# Patient Record
Sex: FEMALE | Race: BLACK OR AFRICAN AMERICAN | NOT HISPANIC OR LATINO | Employment: OTHER | RURAL
[De-identification: names, ages, dates, MRNs, and addresses within clinical notes are randomized per-mention and may not be internally consistent; named-entity substitution may affect disease eponyms.]

---

## 2019-01-23 ENCOUNTER — HISTORICAL (OUTPATIENT)
Dept: ADMINISTRATIVE | Facility: HOSPITAL | Age: 54
End: 2019-01-23

## 2019-01-24 LAB
LAB AP CLINICAL INFORMATION: NORMAL
LAB AP DIAGNOSIS - HISTORICAL: NORMAL
LAB AP GROSS PATHOLOGY - HISTORICAL: NORMAL
LAB AP SPECIMEN SUBMITTED - HISTORICAL: NORMAL

## 2020-04-09 ENCOUNTER — HISTORICAL (OUTPATIENT)
Dept: ADMINISTRATIVE | Facility: HOSPITAL | Age: 55
End: 2020-04-09

## 2020-04-28 ENCOUNTER — HISTORICAL (OUTPATIENT)
Dept: ADMINISTRATIVE | Facility: HOSPITAL | Age: 55
End: 2020-04-28

## 2020-04-28 LAB
BASOPHILS # BLD AUTO: 0.01 X10E3/UL (ref 0–0.2)
BASOPHILS NFR BLD AUTO: 0.2 % (ref 0–1)
EOSINOPHIL # BLD AUTO: 0 X10E3/UL (ref 0–0.5)
EOSINOPHIL NFR BLD AUTO: 0 % (ref 1–4)
ERYTHROCYTE [DISTWIDTH] IN BLOOD BY AUTOMATED COUNT: 15 % (ref 11.5–14.5)
HCT VFR BLD AUTO: 37.8 % (ref 38–47)
HGB BLD-MCNC: 11.9 G/DL (ref 12–16)
IMM GRANULOCYTES # BLD AUTO: 0.02 X10E3/UL (ref 0–0.04)
IMM GRANULOCYTES NFR BLD: 0.5 % (ref 0–0.4)
LYMPHOCYTES # BLD AUTO: 1.38 X10E3/UL (ref 1–4.8)
LYMPHOCYTES NFR BLD AUTO: 33.7 % (ref 27–41)
MCH RBC QN AUTO: 30.1 PG (ref 27–31)
MCHC RBC AUTO-ENTMCNC: 31.5 G/DL (ref 32–36)
MCV RBC AUTO: 95.5 FL (ref 80–96)
MONOCYTES # BLD AUTO: 0.34 X10E3/UL (ref 0–0.8)
MONOCYTES NFR BLD AUTO: 8.3 % (ref 2–6)
MPC BLD CALC-MCNC: 10 FL (ref 9.4–12.4)
NEUTROPHILS # BLD AUTO: 2.34 X10E3/UL (ref 1.8–7.7)
NEUTROPHILS NFR BLD AUTO: 57.3 % (ref 53–65)
NRBC # BLD AUTO: 0 X10E3/UL (ref 0–0)
NRBC, AUTO (.00): 0 /100 (ref 0–0)
PLATELET # BLD AUTO: 233 X10E3/UL (ref 150–400)
POTASSIUM SERPL-SCNC: 3.4 MMOL/L (ref 3.5–5.1)
RBC # BLD AUTO: 3.96 X10E6/UL (ref 4.2–5.4)
WBC # BLD AUTO: 4.09 X10E3/UL (ref 4.5–11)

## 2020-06-10 ENCOUNTER — HISTORICAL (OUTPATIENT)
Dept: ADMINISTRATIVE | Facility: HOSPITAL | Age: 55
End: 2020-06-10

## 2020-06-10 LAB
BACTERIA #/AREA URNS HPF: ABNORMAL /HPF
BILIRUB UR QL STRIP: NEGATIVE MG/DL
CLARITY UR: ABNORMAL
COLOR UR: ABNORMAL
GLUCOSE UR STRIP-MCNC: NEGATIVE MG/DL
KETONES UR STRIP-SCNC: 15 MG/DL
LEUKOCYTE ESTERASE UR QL STRIP: ABNORMAL LEU/UL
MUCOUS THREADS #/AREA URNS HPF: ABNORMAL /HPF
NITRITE UR QL STRIP: NEGATIVE
PH UR STRIP: 5 PH UNITS (ref 5–8)
PROT UR QL STRIP: NEGATIVE MG/DL
RBC # UR STRIP: NEGATIVE ERY/UL
RBC #/AREA URNS HPF: ABNORMAL /HPF (ref 0–3)
SP GR UR STRIP: >=1.03 (ref 1–1.03)
SQUAMOUS #/AREA URNS LPF: ABNORMAL /LPF
UROBILINOGEN UR STRIP-ACNC: 0.2 MG/DL
WBC #/AREA URNS HPF: ABNORMAL /HPF (ref 0–5)

## 2020-06-13 LAB
REPORT: 38
REPORT: NORMAL

## 2020-07-01 ENCOUNTER — HISTORICAL (OUTPATIENT)
Dept: ADMINISTRATIVE | Facility: HOSPITAL | Age: 55
End: 2020-07-01

## 2021-05-18 DIAGNOSIS — I69.359 HEMIPLEGIA FOLLOWING CEREBROVASCULAR ACCIDENT (CVA): Primary | ICD-10-CM

## 2021-06-15 ENCOUNTER — CLINICAL SUPPORT (OUTPATIENT)
Dept: REHABILITATION | Facility: HOSPITAL | Age: 56
End: 2021-06-15
Payer: MEDICARE

## 2021-06-15 DIAGNOSIS — M25.652 STIFFNESS OF HIP JOINT, LEFT: ICD-10-CM

## 2021-06-15 DIAGNOSIS — R29.898 WEAKNESS OF LEFT LEG: ICD-10-CM

## 2021-06-15 DIAGNOSIS — I69.359 HEMIPLEGIA FOLLOWING CEREBROVASCULAR ACCIDENT (CVA): Primary | ICD-10-CM

## 2021-06-15 PROCEDURE — 97162 PT EVAL MOD COMPLEX 30 MIN: CPT

## 2021-06-17 ENCOUNTER — CLINICAL SUPPORT (OUTPATIENT)
Dept: REHABILITATION | Facility: HOSPITAL | Age: 56
End: 2021-06-17
Payer: MEDICARE

## 2021-06-17 DIAGNOSIS — I69.359 HEMIPLEGIA FOLLOWING CEREBROVASCULAR ACCIDENT (CVA): Primary | ICD-10-CM

## 2021-06-17 DIAGNOSIS — M25.652 STIFFNESS OF HIP JOINT, LEFT: ICD-10-CM

## 2021-06-17 DIAGNOSIS — R29.898 WEAKNESS OF LEFT LEG: ICD-10-CM

## 2021-06-17 PROCEDURE — 97110 THERAPEUTIC EXERCISES: CPT

## 2021-06-17 PROCEDURE — 97116 GAIT TRAINING THERAPY: CPT

## 2021-06-22 ENCOUNTER — CLINICAL SUPPORT (OUTPATIENT)
Dept: REHABILITATION | Facility: HOSPITAL | Age: 56
End: 2021-06-22
Payer: MEDICARE

## 2021-06-22 DIAGNOSIS — R29.898 WEAKNESS OF LEFT LEG: Primary | ICD-10-CM

## 2021-06-22 DIAGNOSIS — M25.652 STIFFNESS OF HIP JOINT, LEFT: ICD-10-CM

## 2021-06-22 PROCEDURE — 97110 THERAPEUTIC EXERCISES: CPT

## 2021-06-24 ENCOUNTER — CLINICAL SUPPORT (OUTPATIENT)
Dept: REHABILITATION | Facility: HOSPITAL | Age: 56
End: 2021-06-24
Payer: MEDICARE

## 2021-06-24 DIAGNOSIS — I69.359 HEMIPLEGIA FOLLOWING CEREBROVASCULAR ACCIDENT (CVA): ICD-10-CM

## 2021-06-24 DIAGNOSIS — M25.652 STIFFNESS OF HIP JOINT, LEFT: ICD-10-CM

## 2021-06-24 DIAGNOSIS — R29.898 WEAKNESS OF LEFT LEG: Primary | ICD-10-CM

## 2021-06-24 PROCEDURE — 97110 THERAPEUTIC EXERCISES: CPT

## 2021-06-24 PROCEDURE — 97116 GAIT TRAINING THERAPY: CPT

## 2021-07-01 ENCOUNTER — CLINICAL SUPPORT (OUTPATIENT)
Dept: REHABILITATION | Facility: HOSPITAL | Age: 56
End: 2021-07-01
Payer: MEDICARE

## 2021-07-01 DIAGNOSIS — R29.898 WEAKNESS OF LEFT LEG: Primary | ICD-10-CM

## 2021-07-01 DIAGNOSIS — M25.652 STIFFNESS OF HIP JOINT, LEFT: ICD-10-CM

## 2021-07-01 PROCEDURE — 97110 THERAPEUTIC EXERCISES: CPT

## 2021-07-01 PROCEDURE — 97116 GAIT TRAINING THERAPY: CPT

## 2021-07-06 ENCOUNTER — CLINICAL SUPPORT (OUTPATIENT)
Dept: REHABILITATION | Facility: HOSPITAL | Age: 56
End: 2021-07-06
Payer: MEDICARE

## 2021-07-06 DIAGNOSIS — R29.898 WEAKNESS OF LEFT LEG: Primary | ICD-10-CM

## 2021-07-06 DIAGNOSIS — M25.652 STIFFNESS OF HIP JOINT, LEFT: ICD-10-CM

## 2021-07-06 PROCEDURE — 97116 GAIT TRAINING THERAPY: CPT

## 2021-07-06 PROCEDURE — 97110 THERAPEUTIC EXERCISES: CPT

## 2021-07-08 ENCOUNTER — CLINICAL SUPPORT (OUTPATIENT)
Dept: REHABILITATION | Facility: HOSPITAL | Age: 56
End: 2021-07-08
Payer: MEDICARE

## 2021-07-08 DIAGNOSIS — R29.898 WEAKNESS OF LEFT LEG: Primary | ICD-10-CM

## 2021-07-08 DIAGNOSIS — M25.652 STIFFNESS OF HIP JOINT, LEFT: ICD-10-CM

## 2021-07-08 PROCEDURE — 97116 GAIT TRAINING THERAPY: CPT

## 2021-07-08 PROCEDURE — 97110 THERAPEUTIC EXERCISES: CPT

## 2021-07-13 ENCOUNTER — CLINICAL SUPPORT (OUTPATIENT)
Dept: REHABILITATION | Facility: HOSPITAL | Age: 56
End: 2021-07-13
Payer: MEDICARE

## 2021-07-13 DIAGNOSIS — M25.652 STIFFNESS OF HIP JOINT, LEFT: ICD-10-CM

## 2021-07-13 DIAGNOSIS — R29.898 WEAKNESS OF LEFT LEG: ICD-10-CM

## 2021-07-13 PROCEDURE — 97110 THERAPEUTIC EXERCISES: CPT | Mod: CQ

## 2021-07-13 PROCEDURE — 97116 GAIT TRAINING THERAPY: CPT | Mod: CQ

## 2021-07-15 ENCOUNTER — CLINICAL SUPPORT (OUTPATIENT)
Dept: REHABILITATION | Facility: HOSPITAL | Age: 56
End: 2021-07-15
Payer: MEDICARE

## 2021-07-15 DIAGNOSIS — M25.652 STIFFNESS OF HIP JOINT, LEFT: ICD-10-CM

## 2021-07-15 DIAGNOSIS — I69.359 HEMIPLEGIA FOLLOWING CEREBROVASCULAR ACCIDENT (CVA): ICD-10-CM

## 2021-07-15 DIAGNOSIS — R29.898 WEAKNESS OF LEFT LEG: Primary | ICD-10-CM

## 2021-07-15 PROCEDURE — 97116 GAIT TRAINING THERAPY: CPT

## 2021-07-15 PROCEDURE — 97110 THERAPEUTIC EXERCISES: CPT

## 2021-07-20 ENCOUNTER — CLINICAL SUPPORT (OUTPATIENT)
Dept: REHABILITATION | Facility: HOSPITAL | Age: 56
End: 2021-07-20
Payer: MEDICARE

## 2021-07-20 DIAGNOSIS — R29.898 WEAKNESS OF LEFT LEG: ICD-10-CM

## 2021-07-20 DIAGNOSIS — M25.652 STIFFNESS OF HIP JOINT, LEFT: ICD-10-CM

## 2021-07-20 PROBLEM — I69.359: Status: RESOLVED | Noted: 2021-06-15 | Resolved: 2021-07-20

## 2021-07-20 PROCEDURE — 97110 THERAPEUTIC EXERCISES: CPT | Mod: CQ

## 2021-07-20 PROCEDURE — 97116 GAIT TRAINING THERAPY: CPT | Mod: CQ

## 2022-10-17 ENCOUNTER — HOSPITAL ENCOUNTER (EMERGENCY)
Facility: HOSPITAL | Age: 57
Discharge: HOME | End: 2022-10-17
Payer: MEDICARE

## 2022-10-17 VITALS
BODY MASS INDEX: 48.76 KG/M2 | OXYGEN SATURATION: 99 % | DIASTOLIC BLOOD PRESSURE: 75 MMHG | SYSTOLIC BLOOD PRESSURE: 145 MMHG | WEIGHT: 265 LBS | HEIGHT: 62 IN | TEMPERATURE: 99 F | RESPIRATION RATE: 20 BRPM | HEART RATE: 69 BPM

## 2022-10-17 DIAGNOSIS — J06.9 UPPER RESPIRATORY TRACT INFECTION, UNSPECIFIED TYPE: Primary | ICD-10-CM

## 2022-10-17 DIAGNOSIS — R07.9 CHEST PAIN: ICD-10-CM

## 2022-10-17 PROCEDURE — 93005 ELECTROCARDIOGRAM TRACING: CPT

## 2022-10-17 PROCEDURE — 99283 EMERGENCY DEPT VISIT LOW MDM: CPT

## 2022-10-17 PROCEDURE — 93010 ELECTROCARDIOGRAM REPORT: CPT | Performed by: FAMILY MEDICINE

## 2022-10-17 PROCEDURE — 99282 EMERGENCY DEPT VISIT SF MDM: CPT | Mod: GF | Performed by: NURSE PRACTITIONER

## 2022-10-18 NOTE — DISCHARGE INSTRUCTIONS
Follow-up with primary care.  Treat symptoms.  Drink plenty of water.  Return to the ER as needed for emergent conditions.

## 2022-10-18 NOTE — ED PROVIDER NOTES
"Encounter Date: 10/17/2022       History     Chief Complaint   Patient presents with    Cough    Chest Pain     Left chest hurts when she coughs, no steady chest pain, coughing for 3 days now     Patient presents with nasal drainage, congestion, and a painful cough times 2-3 days.  Pain is under her left breast she thought it was a tight bra strap.  The pain is only present with a cough and resolved thereafter.  No change in intake or output.  She called her PCP today, but was unable to arrange transport.    Review of patient's allergies indicates:   Allergen Reactions    Pcn [penicillins] Rash     Past Medical History:   Diagnosis Date    Hypertension     Stroke      History reviewed. No pertinent surgical history.  History reviewed. No pertinent family history.  Social History     Tobacco Use    Smoking status: Never    Smokeless tobacco: Current     Types: Chew, Snuff   Substance Use Topics    Alcohol use: Yes     Comment: beer "almost every day", states 2-3 quarts of beer a day    Drug use: Never     Review of Systems   Constitutional:  Negative for fever.   HENT:  Positive for congestion and rhinorrhea. Negative for trouble swallowing.    Eyes:  Negative for visual disturbance.   Respiratory:  Positive for cough. Negative for shortness of breath.    Cardiovascular:  Positive for chest pain (bsent at rest, present with cough). Negative for palpitations and leg swelling.   Gastrointestinal:  Negative for abdominal pain, diarrhea, nausea and vomiting.   Genitourinary:  Negative for decreased urine volume, dysuria, frequency and hematuria.   Skin:  Negative for color change.   Neurological:  Negative for dizziness, numbness and headaches.     Physical Exam     Initial Vitals [10/17/22 1828]   BP Pulse Resp Temp SpO2   (!) 152/82 64 (!) 22 98.5 °F (36.9 °C) 99 %      MAP       --         Physical Exam    Nursing note and vitals reviewed.  Constitutional: No distress.   HENT:   Head: Normocephalic and atraumatic. "   Injected with moderate PND   Eyes: EOM are normal. Pupils are equal, round, and reactive to light.   Neck: Neck supple.   Cardiovascular:  Normal rate, regular rhythm and normal heart sounds.           Pulmonary/Chest: Breath sounds normal. No respiratory distress. She exhibits no tenderness.   Abdominal: Abdomen is soft. There is no abdominal tenderness.   Musculoskeletal:         General: Normal range of motion.      Cervical back: Neck supple.     Neurological: She is alert. GCS score is 15. GCS eye subscore is 4. GCS verbal subscore is 5. GCS motor subscore is 6.   Skin: Skin is warm and dry. Capillary refill takes less than 2 seconds.       Medical Screening Exam   See Full Note    ED Course   Procedures  Labs Reviewed - No data to display       Imaging Results    None          Medications - No data to display                    Clinical Impression:   Final diagnoses:  [J06.9] Upper respiratory tract infection, unspecified type (Primary)      ED Disposition Condition    Discharge Stable          ED Prescriptions    None       Follow-up Information    None          TODD Modi  10/17/22 1938

## 2023-07-18 ENCOUNTER — HOSPITAL ENCOUNTER (EMERGENCY)
Facility: HOSPITAL | Age: 58
Discharge: HOME | End: 2023-07-18
Payer: MEDICARE

## 2023-07-18 VITALS
HEART RATE: 65 BPM | RESPIRATION RATE: 20 BRPM | TEMPERATURE: 98 F | SYSTOLIC BLOOD PRESSURE: 141 MMHG | HEIGHT: 62 IN | OXYGEN SATURATION: 100 % | BODY MASS INDEX: 46.93 KG/M2 | DIASTOLIC BLOOD PRESSURE: 78 MMHG | WEIGHT: 255 LBS

## 2023-07-18 DIAGNOSIS — M54.50 ACUTE RIGHT-SIDED LOW BACK PAIN WITHOUT SCIATICA: Primary | ICD-10-CM

## 2023-07-18 DIAGNOSIS — M25.552 LEFT HIP PAIN: ICD-10-CM

## 2023-07-18 PROCEDURE — 99283 PR EMERGENCY DEPT VISIT,LEVEL III: ICD-10-PCS | Mod: ,,, | Performed by: PHYSICIAN ASSISTANT

## 2023-07-18 PROCEDURE — 99284 EMERGENCY DEPT VISIT MOD MDM: CPT

## 2023-07-18 PROCEDURE — 99283 EMERGENCY DEPT VISIT LOW MDM: CPT | Mod: ,,, | Performed by: PHYSICIAN ASSISTANT

## 2023-07-18 PROCEDURE — 96372 THER/PROPH/DIAG INJ SC/IM: CPT | Performed by: PHYSICIAN ASSISTANT

## 2023-07-18 PROCEDURE — 63600175 PHARM REV CODE 636 W HCPCS: Performed by: PHYSICIAN ASSISTANT

## 2023-07-18 RX ORDER — KETOROLAC TROMETHAMINE 30 MG/ML
30 INJECTION, SOLUTION INTRAMUSCULAR; INTRAVENOUS
Status: COMPLETED | OUTPATIENT
Start: 2023-07-18 | End: 2023-07-18

## 2023-07-18 RX ADMIN — KETOROLAC TROMETHAMINE 30 MG: 30 INJECTION, SOLUTION INTRAMUSCULAR; INTRAVENOUS at 09:07

## 2023-07-18 NOTE — ED PROVIDER NOTES
"Encounter Date: 7/18/2023       History     Chief Complaint   Patient presents with    Fall     Fell last Tuesday, states foot twisted and now with lower middle back and right rib pain     Patient is a 57-year-old female with history of right-sided low back pain, and right-sided hip pain secondary to falling 6 days ago.    She states she just wanted to make sure was not broken.    She had a previous CVA on the right side, and was transferring and slipped.  She has past medical history stroke, hypertension.    Denies any surgical history.    She is an everyday tobacco snuff user.    Daily alcohol user.    Denies any drugs.    Review of patient's allergies indicates:   Allergen Reactions    Pcn [penicillins] Rash     Past Medical History:   Diagnosis Date    Hypertension     Stroke      History reviewed. No pertinent surgical history.  History reviewed. No pertinent family history.  Social History     Tobacco Use    Smoking status: Never    Smokeless tobacco: Current     Types: Chew, Snuff   Substance Use Topics    Alcohol use: Yes     Comment: beer "almost every day", states 2-3 quarts of beer a day but states "has slacked up now"    Drug use: Never     Review of Systems   Musculoskeletal:  Positive for back pain.        Right hip pain     Physical Exam     Initial Vitals [07/18/23 0834]   BP Pulse Resp Temp SpO2   (!) 141/78 65 20 98.2 °F (36.8 °C) 100 %      MAP       --         Physical Exam    Nursing note and vitals reviewed.  Constitutional: She appears well-nourished. No distress.   HENT:   Head: Atraumatic.   Eyes: EOM are normal.   Neck: Neck supple.   Cardiovascular:  Normal rate and regular rhythm.           Pulmonary/Chest: No respiratory distress.   Musculoskeletal:      Cervical back: Neck supple.      Comments: Tender to palpation at L1-L2 on the right side, mainly on the paraspinal.    Also has right lateral hip pain at the top of the pelvis.         Neurological: She is alert and oriented to person, " place, and time.   Skin: Skin is dry.   Psychiatric: She has a normal mood and affect.       Medical Screening Exam   See Full Note    ED Course   Procedures  Labs Reviewed - No data to display       Imaging Results              X-Ray Hips Bilateral 2 View Incl AP Pelvis (Final result)  Result time 07/18/23 10:04:25      Final result by Arie Gomez II, MD (07/18/23 10:04:25)                   Impression:      Mild hip osteoarthrosis.      Electronically signed by: Arie Gomez  Date:    07/18/2023  Time:    10:04               Narrative:    EXAMINATION:  XR HIPS BILATERAL 2 VIEW INCL AP PELVIS    CLINICAL HISTORY:  Pain in left hip    COMPARISON:  None available    TECHNIQUE:  XR HIPS BILATERAL 2 VIEW INCL AP PELVIS    FINDINGS:  No evidence of fracture seen.  The alignment of the joints appears normal.  Mild bilateral hip degenerative change is present.  No soft tissue abnormality is seen.                                       X-Ray Lumbar Spine Ap And Lateral (Final result)  Result time 07/18/23 10:05:15      Final result by Arie Gomez II, MD (07/18/23 10:05:15)                   Impression:      Mild-to-moderate facet joint osteoarthrosis.      Electronically signed by: Arie Gomez  Date:    07/18/2023  Time:    10:05               Narrative:    EXAMINATION:  XR LUMBAR SPINE AP AND LATERAL    CLINICAL HISTORY:  low back pain/fall;    COMPARISON:  None.    TECHNIQUE:  XR LUMBAR SPINE AP AND LATERAL    FINDINGS:  No fracture is seen. Vertebral body heights and alignment appear normal.  Lower lumbar spine detail is limited..  The disc space heights are well-maintained.  Mild-to-moderate lower lumbar spine facet joint degenerative change is present.                                       Medications   ketorolac injection 30 mg (30 mg Intramuscular Given 7/18/23 0912)     Medical Decision Making:   Initial Assessment:   Patient is a 57-year-old female with history of right-sided low back pain, and  right-sided hip pain secondary to falling 6 days ago.    She states she just wanted to make sure was not broken.    She had a previous CVA on the right side, and was transferring and slipped.  She has past medical history stroke, hypertension.    Denies any surgical history.    She is an everyday tobacco snuff user.    Daily alcohol user.    Denies any drugs.  Differential Diagnosis:   Fracture versus sprain  ED Management:  Patient was given Toradol, and x-rays taken of the L-spine , and pelvis    X-rays are negative for fracture, patient states she is feeling much better after Toradol.                           Clinical Impression:   Final diagnoses:  [M25.552] Left hip pain  [M54.50] Acute right-sided low back pain without sciatica (Primary)               HECTOR Hampton  07/18/23 0915       HECTOR Hampton  07/18/23 1009

## 2023-07-31 ENCOUNTER — HOSPITAL ENCOUNTER (INPATIENT)
Facility: HOSPITAL | Age: 58
LOS: 3 days | Discharge: HOME | DRG: 291 | End: 2023-08-03
Attending: STUDENT IN AN ORGANIZED HEALTH CARE EDUCATION/TRAINING PROGRAM | Admitting: STUDENT IN AN ORGANIZED HEALTH CARE EDUCATION/TRAINING PROGRAM
Payer: MEDICARE

## 2023-07-31 ENCOUNTER — HOSPITAL ENCOUNTER (EMERGENCY)
Facility: HOSPITAL | Age: 58
Discharge: ANOTHER HOSPITAL | End: 2023-07-31
Attending: EMERGENCY MEDICINE
Payer: MEDICARE

## 2023-07-31 VITALS
TEMPERATURE: 99 F | HEART RATE: 94 BPM | DIASTOLIC BLOOD PRESSURE: 109 MMHG | OXYGEN SATURATION: 98 % | SYSTOLIC BLOOD PRESSURE: 193 MMHG | HEIGHT: 62 IN | RESPIRATION RATE: 20 BRPM | WEIGHT: 245 LBS | BODY MASS INDEX: 45.08 KG/M2

## 2023-07-31 DIAGNOSIS — I50.9 CONGESTIVE HEART FAILURE: ICD-10-CM

## 2023-07-31 DIAGNOSIS — N30.00 ACUTE CYSTITIS WITHOUT HEMATURIA: ICD-10-CM

## 2023-07-31 DIAGNOSIS — R06.00 ACUTE DYSPNEA: ICD-10-CM

## 2023-07-31 DIAGNOSIS — R09.02 HYPOXIA: Primary | ICD-10-CM

## 2023-07-31 DIAGNOSIS — R07.9 CHEST PAIN: ICD-10-CM

## 2023-07-31 DIAGNOSIS — R79.89 POSITIVE D DIMER: ICD-10-CM

## 2023-07-31 DIAGNOSIS — R06.02 SOB (SHORTNESS OF BREATH): ICD-10-CM

## 2023-07-31 DIAGNOSIS — I50.32 HYPERTENSIVE HEART DISEASE WITH CHRONIC DIASTOLIC CONGESTIVE HEART FAILURE: ICD-10-CM

## 2023-07-31 DIAGNOSIS — I10 ESSENTIAL HYPERTENSION: ICD-10-CM

## 2023-07-31 DIAGNOSIS — R06.00 DYSPNEA: ICD-10-CM

## 2023-07-31 DIAGNOSIS — I11.0 HYPERTENSIVE HEART DISEASE WITH CHRONIC DIASTOLIC CONGESTIVE HEART FAILURE: ICD-10-CM

## 2023-07-31 DIAGNOSIS — I50.9 ACUTE CONGESTIVE HEART FAILURE, UNSPECIFIED HEART FAILURE TYPE: Primary | ICD-10-CM

## 2023-07-31 DIAGNOSIS — I10 HYPERTENSION, UNSPECIFIED TYPE: ICD-10-CM

## 2023-07-31 DIAGNOSIS — I50.9 CONGESTIVE HEART FAILURE, UNSPECIFIED HF CHRONICITY, UNSPECIFIED HEART FAILURE TYPE: ICD-10-CM

## 2023-07-31 PROBLEM — E66.01 MORBID OBESITY: Status: ACTIVE | Noted: 2021-05-18

## 2023-07-31 PROBLEM — I69.959 HEMIPLEGIA OF NONDOMINANT SIDE AS LATE EFFECT OF CEREBROVASCULAR DISEASE: Status: ACTIVE | Noted: 2021-05-18

## 2023-07-31 PROBLEM — F10.90 ALCOHOL USE DISORDER: Status: ACTIVE | Noted: 2023-07-31

## 2023-07-31 PROBLEM — D75.89 MACROCYTOSIS WITHOUT ANEMIA: Status: ACTIVE | Noted: 2023-07-31

## 2023-07-31 PROBLEM — R16.0 LIVER MASS: Status: ACTIVE | Noted: 2023-06-13

## 2023-07-31 PROBLEM — R39.81 FUNCTIONAL URINARY INCONTINENCE: Status: ACTIVE | Noted: 2021-05-18

## 2023-07-31 LAB
ALBUMIN SERPL BCP-MCNC: 3.3 G/DL (ref 3.5–5)
ALBUMIN/GLOB SERPL: 0.7 {RATIO}
ALP SERPL-CCNC: 91 U/L (ref 46–118)
ALT SERPL W P-5'-P-CCNC: 53 U/L (ref 13–56)
ANION GAP SERPL CALCULATED.3IONS-SCNC: 15 MMOL/L (ref 7–16)
AST SERPL W P-5'-P-CCNC: 50 U/L (ref 15–37)
BACTERIA #/AREA URNS HPF: ABNORMAL /HPF
BASE EXCESS ARTERIAL: -1.7 MMOL/L (ref -2–2)
BASOPHILS # BLD AUTO: 0.02 K/UL (ref 0–0.2)
BASOPHILS NFR BLD AUTO: 0.4 % (ref 0–1)
BILIRUB SERPL-MCNC: 0.8 MG/DL (ref ?–1.2)
BILIRUB UR QL STRIP: NEGATIVE
BUN SERPL-MCNC: 6 MG/DL (ref 7–18)
BUN/CREAT SERPL: 6 (ref 6–20)
CALCIUM SERPL-MCNC: 9.3 MG/DL (ref 8.5–10.1)
CHLORIDE SERPL-SCNC: 106 MMOL/L (ref 98–107)
CLARITY UR: CLEAR
CO2 SERPL-SCNC: 25 MMOL/L (ref 21–32)
COLOR UR: YELLOW
CREAT SERPL-MCNC: 1 MG/DL (ref 0.55–1.02)
D DIMER PPP FEU-MCNC: 1.13 ΜG/ML (ref 0–0.47)
DIFFERENTIAL METHOD BLD: ABNORMAL
EGFR (NO RACE VARIABLE) (RUSH/TITUS): 65 ML/MIN/1.73M2
EOSINOPHIL # BLD AUTO: 0 K/UL (ref 0–0.5)
EOSINOPHIL NFR BLD AUTO: 0 % (ref 1–4)
ERYTHROCYTE [DISTWIDTH] IN BLOOD BY AUTOMATED COUNT: 14.1 % (ref 11.5–14.5)
ETHANOL, BLOOD (CATEGORY): NOT DETECTED
FLUAV AG UPPER RESP QL IA.RAPID: NEGATIVE
FLUBV AG UPPER RESP QL IA.RAPID: NEGATIVE
FOLATE SERPL-MCNC: 5.4 NG/ML (ref 3.1–17.5)
GLOBULIN SER-MCNC: 4.6 G/DL (ref 2–4)
GLUCOSE SERPL-MCNC: 91 MG/DL (ref 74–106)
GLUCOSE UR STRIP-MCNC: NEGATIVE MG/DL
HCO3 ARTERIAL: 21.7 MMOL/L (ref 18–23)
HCT VFR BLD AUTO: 42.1 % (ref 38–47)
HGB BLD-MCNC: 14.2 G/DL (ref 12–16)
HGB BLD-MCNC: ABNORMAL G/DL
KETONES UR STRIP-SCNC: ABNORMAL MG/DL
LEUKOCYTE ESTERASE UR QL STRIP: ABNORMAL
LYMPHOCYTES # BLD AUTO: 2.26 K/UL (ref 1–4.8)
LYMPHOCYTES NFR BLD AUTO: 43.3 % (ref 27–41)
MCH RBC QN AUTO: 35 PG (ref 27–31)
MCHC RBC AUTO-ENTMCNC: 33.7 G/DL (ref 32–36)
MCV RBC AUTO: 103.7 FL (ref 80–96)
MONOCYTES # BLD AUTO: 0.3 K/UL (ref 0–0.8)
MONOCYTES NFR BLD AUTO: 5.7 % (ref 2–6)
MPC BLD CALC-MCNC: 10.4 FL (ref 9.4–12.4)
NEUTROPHILS # BLD AUTO: 2.64 K/UL (ref 1.8–7.7)
NEUTROPHILS NFR BLD AUTO: 50.6 % (ref 53–65)
NITRITE UR QL STRIP: NEGATIVE
NT-PROBNP SERPL-MCNC: 454 PG/ML (ref 1–125)
PCO2 ARTERIAL: 32
PCO2 BLDA: ABNORMAL MM[HG]
PH ARTERIAL: 7.44
PH UR STRIP: 5.5 PH UNITS
PLATELET # BLD AUTO: 242 K/UL (ref 150–400)
PO2 ARTERIAL: 51
POC COHB: ABNORMAL
POC FIO2: ABNORMAL
POC IONIZED CALCIUM: ABNORMAL
POC METHB: ABNORMAL
POC O2HB: ABNORMAL
POTASSIUM BLD-SCNC: ABNORMAL MMOL/L
POTASSIUM SERPL-SCNC: 4.6 MMOL/L (ref 3.5–5.1)
PROT SERPL-MCNC: 7.9 G/DL (ref 6.4–8.2)
PROT UR QL STRIP: NEGATIVE
RBC # BLD AUTO: 4.06 M/UL (ref 4.2–5.4)
RBC # UR STRIP: ABNORMAL /UL
SARS-COV+SARS-COV-2 AG RESP QL IA.RAPID: NEGATIVE
SATURATED O2 ARTERIAL, I-STAT: 87
SODIUM BLD-SCNC: ABNORMAL MMOL/L
SODIUM SERPL-SCNC: 141 MMOL/L (ref 136–145)
SP GR UR STRIP: 1.02
SQUAMOUS #/AREA URNS LPF: ABNORMAL /LPF
TROPONIN I SERPL DL<=0.01 NG/ML-MCNC: 5.6 PG/ML
UROBILINOGEN UR STRIP-ACNC: 0.2 MG/DL
VIT B12 SERPL-MCNC: 625 PG/ML (ref 193–986)
WBC # BLD AUTO: 5.22 K/UL (ref 4.5–11)
WBC #/AREA URNS HPF: ABNORMAL /HPF

## 2023-07-31 PROCEDURE — 82746 ASSAY OF FOLIC ACID SERUM: CPT | Performed by: FAMILY MEDICINE

## 2023-07-31 PROCEDURE — 87040 BLOOD CULTURE FOR BACTERIA: CPT | Performed by: EMERGENCY MEDICINE

## 2023-07-31 PROCEDURE — 93010 ELECTROCARDIOGRAM REPORT: CPT | Performed by: FAMILY MEDICINE

## 2023-07-31 PROCEDURE — 81001 URINALYSIS AUTO W/SCOPE: CPT | Performed by: EMERGENCY MEDICINE

## 2023-07-31 PROCEDURE — 93005 ELECTROCARDIOGRAM TRACING: CPT

## 2023-07-31 PROCEDURE — 25000003 PHARM REV CODE 250: Performed by: EMERGENCY MEDICINE

## 2023-07-31 PROCEDURE — 82077 ASSAY SPEC XCP UR&BREATH IA: CPT | Performed by: EMERGENCY MEDICINE

## 2023-07-31 PROCEDURE — 94640 AIRWAY INHALATION TREATMENT: CPT

## 2023-07-31 PROCEDURE — 85025 COMPLETE CBC W/AUTO DIFF WBC: CPT | Performed by: EMERGENCY MEDICINE

## 2023-07-31 PROCEDURE — 25000003 PHARM REV CODE 250: Performed by: FAMILY MEDICINE

## 2023-07-31 PROCEDURE — 63600175 PHARM REV CODE 636 W HCPCS: Performed by: EMERGENCY MEDICINE

## 2023-07-31 PROCEDURE — 99222 1ST HOSP IP/OBS MODERATE 55: CPT | Mod: ,,, | Performed by: FAMILY MEDICINE

## 2023-07-31 PROCEDURE — 83880 ASSAY OF NATRIURETIC PEPTIDE: CPT | Performed by: EMERGENCY MEDICINE

## 2023-07-31 PROCEDURE — 99285 EMERGENCY DEPT VISIT HI MDM: CPT | Mod: 25

## 2023-07-31 PROCEDURE — 36600 WITHDRAWAL OF ARTERIAL BLOOD: CPT

## 2023-07-31 PROCEDURE — 99222 PR INITIAL HOSPITAL CARE,LEVL II: ICD-10-PCS | Mod: ,,, | Performed by: FAMILY MEDICINE

## 2023-07-31 PROCEDURE — 96374 THER/PROPH/DIAG INJ IV PUSH: CPT

## 2023-07-31 PROCEDURE — 63600175 PHARM REV CODE 636 W HCPCS: Performed by: FAMILY MEDICINE

## 2023-07-31 PROCEDURE — 84484 ASSAY OF TROPONIN QUANT: CPT | Performed by: EMERGENCY MEDICINE

## 2023-07-31 PROCEDURE — 94761 N-INVAS EAR/PLS OXIMETRY MLT: CPT

## 2023-07-31 PROCEDURE — 11000001 HC ACUTE MED/SURG PRIVATE ROOM

## 2023-07-31 PROCEDURE — 25000242 PHARM REV CODE 250 ALT 637 W/ HCPCS: Performed by: EMERGENCY MEDICINE

## 2023-07-31 PROCEDURE — 80053 COMPREHEN METABOLIC PANEL: CPT | Performed by: EMERGENCY MEDICINE

## 2023-07-31 PROCEDURE — 87086 URINE CULTURE/COLONY COUNT: CPT | Performed by: EMERGENCY MEDICINE

## 2023-07-31 PROCEDURE — 87428 SARSCOV & INF VIR A&B AG IA: CPT | Performed by: EMERGENCY MEDICINE

## 2023-07-31 PROCEDURE — 85379 FIBRIN DEGRADATION QUANT: CPT | Performed by: EMERGENCY MEDICINE

## 2023-07-31 PROCEDURE — 99285 EMERGENCY DEPT VISIT HI MDM: CPT | Performed by: EMERGENCY MEDICINE

## 2023-07-31 PROCEDURE — 99900035 HC TECH TIME PER 15 MIN (STAT)

## 2023-07-31 PROCEDURE — 27000221 HC OXYGEN, UP TO 24 HOURS

## 2023-07-31 RX ORDER — LANOLIN ALCOHOL/MO/W.PET/CERES
800 CREAM (GRAM) TOPICAL
Status: DISCONTINUED | OUTPATIENT
Start: 2023-07-31 | End: 2023-08-03 | Stop reason: HOSPADM

## 2023-07-31 RX ORDER — TALC
9 POWDER (GRAM) TOPICAL NIGHTLY PRN
Status: DISCONTINUED | OUTPATIENT
Start: 2023-07-31 | End: 2023-08-03 | Stop reason: HOSPADM

## 2023-07-31 RX ORDER — CLORAZEPATE DIPOTASSIUM 3.75 MG/1
7.5 TABLET ORAL 3 TIMES DAILY
Status: DISCONTINUED | OUTPATIENT
Start: 2023-07-31 | End: 2023-08-03 | Stop reason: HOSPADM

## 2023-07-31 RX ORDER — SODIUM CHLORIDE 0.9 % (FLUSH) 0.9 %
10 SYRINGE (ML) INJECTION EVERY 12 HOURS PRN
Status: DISCONTINUED | OUTPATIENT
Start: 2023-07-31 | End: 2023-08-03 | Stop reason: HOSPADM

## 2023-07-31 RX ORDER — IBUPROFEN 200 MG
24 TABLET ORAL
Status: DISCONTINUED | OUTPATIENT
Start: 2023-07-31 | End: 2023-08-03 | Stop reason: HOSPADM

## 2023-07-31 RX ORDER — HYDROCODONE BITARTRATE AND ACETAMINOPHEN 5; 325 MG/1; MG/1
1 TABLET ORAL EVERY 6 HOURS PRN
Status: DISCONTINUED | OUTPATIENT
Start: 2023-07-31 | End: 2023-08-03 | Stop reason: HOSPADM

## 2023-07-31 RX ORDER — POLYETHYLENE GLYCOL 3350 17 G/17G
17 POWDER, FOR SOLUTION ORAL DAILY
Status: DISCONTINUED | OUTPATIENT
Start: 2023-08-01 | End: 2023-08-03 | Stop reason: HOSPADM

## 2023-07-31 RX ORDER — IBUPROFEN 200 MG
16 TABLET ORAL
Status: DISCONTINUED | OUTPATIENT
Start: 2023-07-31 | End: 2023-08-03 | Stop reason: HOSPADM

## 2023-07-31 RX ORDER — LEVOFLOXACIN 500 MG/1
500 TABLET, FILM COATED ORAL DAILY
Qty: 5 TABLET | Refills: 0 | Status: ON HOLD | OUTPATIENT
Start: 2023-07-31 | End: 2023-08-03 | Stop reason: HOSPADM

## 2023-07-31 RX ORDER — NALOXONE HCL 0.4 MG/ML
0.02 VIAL (ML) INJECTION
Status: DISCONTINUED | OUTPATIENT
Start: 2023-07-31 | End: 2023-08-03 | Stop reason: HOSPADM

## 2023-07-31 RX ORDER — NITROGLYCERIN 0.4 MG/1
0.4 TABLET SUBLINGUAL
Status: COMPLETED | OUTPATIENT
Start: 2023-07-31 | End: 2023-07-31

## 2023-07-31 RX ORDER — METOPROLOL TARTRATE 50 MG/1
50 TABLET ORAL 2 TIMES DAILY
Status: ON HOLD | COMMUNITY
End: 2023-08-03 | Stop reason: HOSPADM

## 2023-07-31 RX ORDER — PROMETHAZINE HYDROCHLORIDE 25 MG/1
25 TABLET ORAL EVERY 6 HOURS PRN
Status: DISCONTINUED | OUTPATIENT
Start: 2023-07-31 | End: 2023-08-03 | Stop reason: HOSPADM

## 2023-07-31 RX ORDER — FUROSEMIDE 10 MG/ML
40 INJECTION INTRAMUSCULAR; INTRAVENOUS DAILY
Status: DISCONTINUED | OUTPATIENT
Start: 2023-08-01 | End: 2023-08-03 | Stop reason: HOSPADM

## 2023-07-31 RX ORDER — ENOXAPARIN SODIUM 100 MG/ML
40 INJECTION SUBCUTANEOUS EVERY 24 HOURS
Status: DISCONTINUED | OUTPATIENT
Start: 2023-07-31 | End: 2023-08-03 | Stop reason: HOSPADM

## 2023-07-31 RX ORDER — ONDANSETRON 2 MG/ML
4 INJECTION INTRAMUSCULAR; INTRAVENOUS EVERY 8 HOURS PRN
Status: DISCONTINUED | OUTPATIENT
Start: 2023-07-31 | End: 2023-08-03 | Stop reason: HOSPADM

## 2023-07-31 RX ORDER — LEVOFLOXACIN 250 MG/1
500 TABLET ORAL
Status: COMPLETED | OUTPATIENT
Start: 2023-07-31 | End: 2023-07-31

## 2023-07-31 RX ORDER — CARVEDILOL 3.12 MG/1
3.12 TABLET ORAL 2 TIMES DAILY
Status: DISCONTINUED | OUTPATIENT
Start: 2023-07-31 | End: 2023-08-02

## 2023-07-31 RX ORDER — HYDRALAZINE HYDROCHLORIDE 20 MG/ML
10 INJECTION INTRAMUSCULAR; INTRAVENOUS EVERY 6 HOURS PRN
Status: DISCONTINUED | OUTPATIENT
Start: 2023-07-31 | End: 2023-08-03 | Stop reason: HOSPADM

## 2023-07-31 RX ORDER — IPRATROPIUM BROMIDE AND ALBUTEROL SULFATE 2.5; .5 MG/3ML; MG/3ML
3 SOLUTION RESPIRATORY (INHALATION)
Status: COMPLETED | OUTPATIENT
Start: 2023-07-31 | End: 2023-07-31

## 2023-07-31 RX ORDER — PANTOPRAZOLE SODIUM 40 MG/1
40 TABLET, DELAYED RELEASE ORAL DAILY
COMMUNITY
Start: 2023-05-24 | End: 2023-08-08 | Stop reason: SDUPTHER

## 2023-07-31 RX ORDER — ALBUTEROL SULFATE 0.83 MG/ML
2.5 SOLUTION RESPIRATORY (INHALATION) EVERY 4 HOURS PRN
Status: DISCONTINUED | OUTPATIENT
Start: 2023-07-31 | End: 2023-08-03 | Stop reason: HOSPADM

## 2023-07-31 RX ORDER — ACETAMINOPHEN 325 MG/1
650 TABLET ORAL EVERY 4 HOURS PRN
Status: DISCONTINUED | OUTPATIENT
Start: 2023-07-31 | End: 2023-08-03 | Stop reason: HOSPADM

## 2023-07-31 RX ORDER — LORAZEPAM 2 MG/ML
2 INJECTION INTRAMUSCULAR
Status: DISCONTINUED | OUTPATIENT
Start: 2023-07-31 | End: 2023-08-03 | Stop reason: HOSPADM

## 2023-07-31 RX ORDER — NAPROXEN SODIUM 220 MG/1
81 TABLET, FILM COATED ORAL DAILY
COMMUNITY

## 2023-07-31 RX ORDER — GLUCAGON 1 MG
1 KIT INJECTION
Status: DISCONTINUED | OUTPATIENT
Start: 2023-07-31 | End: 2023-08-03 | Stop reason: HOSPADM

## 2023-07-31 RX ORDER — DIPHENHYDRAMINE HCL 25 MG
25 CAPSULE ORAL EVERY 6 HOURS PRN
Status: DISCONTINUED | OUTPATIENT
Start: 2023-07-31 | End: 2023-08-03 | Stop reason: HOSPADM

## 2023-07-31 RX ORDER — TRAZODONE HYDROCHLORIDE 50 MG/1
50 TABLET ORAL NIGHTLY PRN
Status: DISCONTINUED | OUTPATIENT
Start: 2023-07-31 | End: 2023-08-03 | Stop reason: HOSPADM

## 2023-07-31 RX ORDER — FUROSEMIDE 10 MG/ML
20 INJECTION INTRAMUSCULAR; INTRAVENOUS
Status: COMPLETED | OUTPATIENT
Start: 2023-07-31 | End: 2023-07-31

## 2023-07-31 RX ADMIN — LORAZEPAM 2 MG: 2 INJECTION INTRAMUSCULAR; INTRAVENOUS at 05:07

## 2023-07-31 RX ADMIN — IPRATROPIUM BROMIDE AND ALBUTEROL SULFATE 3 ML: .5; 3 SOLUTION RESPIRATORY (INHALATION) at 11:07

## 2023-07-31 RX ADMIN — NITROGLYCERIN 1 INCH: 20 OINTMENT TOPICAL at 02:07

## 2023-07-31 RX ADMIN — HYDRALAZINE HYDROCHLORIDE 10 MG: 20 INJECTION, SOLUTION INTRAMUSCULAR; INTRAVENOUS at 05:07

## 2023-07-31 RX ADMIN — ENOXAPARIN SODIUM 40 MG: 100 INJECTION SUBCUTANEOUS at 05:07

## 2023-07-31 RX ADMIN — LEVOFLOXACIN 500 MG: 250 TABLET, FILM COATED ORAL at 02:07

## 2023-07-31 RX ADMIN — GUAIFENESIN AND DEXTROMETHORPHAN HYDROBROMIDE 1 TABLET: 30; 600 TABLET, EXTENDED RELEASE ORAL at 09:07

## 2023-07-31 RX ADMIN — NITROGLYCERIN 0.4 MG: 0.4 TABLET SUBLINGUAL at 02:07

## 2023-07-31 RX ADMIN — CARVEDILOL 3.12 MG: 3.12 TABLET, FILM COATED ORAL at 09:07

## 2023-07-31 RX ADMIN — CLORAZEPATE DIPOTASSIUM 7.5 MG: 3.75 TABLET ORAL at 09:07

## 2023-07-31 RX ADMIN — NITROGLYCERIN 0.4 MG: 0.4 TABLET SUBLINGUAL at 01:07

## 2023-07-31 RX ADMIN — FUROSEMIDE 20 MG: 10 INJECTION, SOLUTION INTRAMUSCULAR; INTRAVENOUS at 12:07

## 2023-07-31 NOTE — ASSESSMENT & PLAN NOTE
Secondary to CHF vs PE vs other. Heart failure most likely, CXR with infiltrates vs edema and pBNP elevated. Further evaluation pending, continue O2 and albuterol prn.

## 2023-07-31 NOTE — ASSESSMENT & PLAN NOTE
Secondary to inflammation (UTI) vs DVT/PE vs other. Does have new shortness of breath, lower extremity edema however this is chronic. No hypoxemia or tachycardia, Wells criteria low probability.    - LE doppler pending   - DVT prophylaxis

## 2023-07-31 NOTE — ASSESSMENT & PLAN NOTE
Daily EtOH use, average 2-3 quarts of beer daily although reports she has been trying to cut back this amount.     - Tranxene scheduled  - Ativan PRN  - encourage cessation +/- rehab  -  for rehab if desired

## 2023-07-31 NOTE — ED PROVIDER NOTES
"Encounter Date: 7/31/2023       History     Chief Complaint   Patient presents with    Shortness of Breath     Started this am around 1000 today     PT IS A 57 YR OLD BM WITH DYSPNEA ONSET THIS AM WITH BASELINE PEDAL EDEMA. PT DENIES ADDITIONAL COMPLAINTS INCLUDING CP, COUGH, FEVER, CHILLS, HEMOPTYSIS, PALPITATIONS, OR SYNCOPE.  PT HAS HX ETOH CONSUMING 2-3 QUARTS  PER DAY FOR  YRS.  PT IS S/P CVA AND HAS COMPLETED PT/OT/REHAB AND IS ON ASA LOW DOSE; PT WAS TREATED  AT [facility]  PT IS ON BB AND STATES IS COMPLIANT WITH LAST DOSE THIS AM.    Past Medical History    Diagnosis Date Comments  Stroke [I63.9]    Hypertension [I10]   Alcoholism           Review of patient's allergies indicates:   Allergen Reactions    Pcn [penicillins] Rash     Past Medical History:   Diagnosis Date    Hypertension     Stroke      History reviewed. No pertinent surgical history.  History reviewed. No pertinent family history.  Social History     Tobacco Use    Smoking status: Never    Smokeless tobacco: Current     Types: Chew, Snuff   Substance Use Topics    Alcohol use: Yes     Comment: beer "almost every day", states 2-3 quarts of beer a day but states "has slacked up now"    Drug use: Never     Review of Systems   Constitutional: Negative.  Negative for fever.   HENT: Negative.  Negative for sore throat.    Eyes: Negative.    Respiratory:  Positive for shortness of breath and wheezing. Negative for cough, choking, chest tightness and stridor.    Cardiovascular:  Positive for leg swelling. Negative for chest pain.   Gastrointestinal: Negative.  Negative for abdominal pain and nausea.   Endocrine: Negative.    Genitourinary: Negative.  Negative for dysuria.   Musculoskeletal: Negative.  Negative for back pain.   Skin: Negative.  Negative for rash.   Allergic/Immunologic: Positive for immunocompromised state.   Neurological: Negative.  Negative for syncope, weakness and light-headedness.   Hematological: Negative.  Does not bruise/bleed " easily.   Psychiatric/Behavioral: Negative.         Physical Exam     Initial Vitals [07/31/23 1105]   BP Pulse Resp Temp SpO2   (!) 187/105 87 20 98.5 °F (36.9 °C) 96 %      MAP       --         Physical Exam    Constitutional: She appears well-developed and well-nourished. She is cooperative. She appears distressed.   HENT:   Head: Normocephalic and atraumatic.   Right Ear: External ear normal.   Left Ear: External ear normal.   Nose: Nose normal.   Eyes: Conjunctivae and EOM are normal. Pupils are equal, round, and reactive to light.   Neck: Trachea normal. Neck supple. JVD present.   Normal range of motion.  Cardiovascular:  Normal rate, regular rhythm and intact distal pulses.     Gallop: 2-3/6 NIR.       Murmur heard.  Pulses:       Radial pulses are 3+ on the right side and 3+ on the left side.        Dorsalis pedis pulses are 3+ on the right side and 3+ on the left side.   Pulmonary/Chest: No respiratory distress. She has wheezes. She has no rhonchi. She has no rales. She exhibits no tenderness.   Abdominal: Abdomen is soft. Bowel sounds are normal. She exhibits no distension. There is no abdominal tenderness. There is no rebound.   Musculoskeletal:         General: Edema present. Normal range of motion.      Right shoulder: Normal.      Left shoulder: Normal.      Right upper arm: Normal.      Left upper arm: Normal.      Right elbow: Normal.      Left elbow: Normal.      Right forearm: Normal.      Left forearm: Normal.      Right wrist: Normal.      Left wrist: Normal.      Right hand: Normal.      Left hand: Normal.      Cervical back: Normal range of motion and neck supple.     Lymphadenopathy:     She has no cervical adenopathy.     She has no axillary adenopathy.   Neurological: She is alert and oriented to person, place, and time. She has normal strength. No cranial nerve deficit or sensory deficit. She displays a negative Romberg sign. GCS eye subscore is 4. GCS verbal subscore is 5. GCS motor  subscore is 6. She displays no Babinski's sign on the right side. She displays no Babinski's sign on the left side.   Reflex Scores:       Tricep reflexes are 2+ on the right side and 2+ on the left side.       Bicep reflexes are 2+ on the right side and 2+ on the left side.       Brachioradialis reflexes are 2+ on the right side and 2+ on the left side.       Patellar reflexes are 2+ on the right side and 2+ on the left side.       Achilles reflexes are 2+ on the right side and 2+ on the left side.  Skin: Skin is warm and dry. Capillary refill takes less than 2 seconds. No rash noted. No erythema.   Psychiatric: She has a normal mood and affect. Her speech is normal and behavior is normal. Judgment and thought content normal. Cognition and memory are normal.         Medical Screening Exam   See Full Note    ED Course   Procedures  Labs Reviewed   COMPREHENSIVE METABOLIC PANEL - Abnormal; Notable for the following components:       Result Value    BUN 6 (*)     Albumin 3.3 (*)     Globulin 4.6 (*)     AST 50 (*)     All other components within normal limits   NT-PRO NATRIURETIC PEPTIDE - Abnormal; Notable for the following components:    ProBNP 454 (*)     All other components within normal limits   URINALYSIS - Abnormal; Notable for the following components:    Leukocytes, UA Trace (*)     Blood, UA Trace-Lysed (*)     All other components within normal limits   CBC WITH DIFFERENTIAL - Abnormal; Notable for the following components:    RBC 4.06 (*)     .7 (*)     MCH 35.0 (*)     Neutrophils % 50.6 (*)     Lymphocytes % 43.3 (*)     Eosinophils % 0.0 (*)     All other components within normal limits   D DIMER, QUANTITATIVE - Abnormal; Notable for the following components:    D-Dimer 1.13 (*)     All other components within normal limits   URINALYSIS, MICROSCOPIC - Abnormal; Notable for the following components:    WBC, UA 11-15 (*)     Bacteria, UA Few (*)     Squamous Epithelial Cells, UA Few (*)     All  other components within normal limits   TROPONIN I - Normal   SARS-COV2 (COVID) W/ FLU ANTIGEN - Normal    Narrative:     Negative SARS-CoV results should not be used as the sole basis for treatment or patient management decisions; negative results should be considered in the context of a patient's recent exposures, history and the presene of clinical signs and symptoms consistent with COVID-19.  Negative results should be treated as presumptive and confirmed by molecular assay, if necessary for patient management.   CULTURE, URINE   CBC W/ AUTO DIFFERENTIAL    Narrative:     The following orders were created for panel order CBC Auto Differential.  Procedure                               Abnormality         Status                     ---------                               -----------         ------                     CBC with Differential[[id number]]        Abnormal            Final result                 Please view results for these tests on the individual orders.   ALCOHOL,MEDICAL (ETHANOL)     EKG Readings: (Independently Interpreted)   Initial Reading: No STEMI. Previous EKG Date: 10/22. Rhythm: Normal Sinus Rhythm. T Waves Flipped: AVR, V1 and V2. Q Waves: V1, V2 and V3. Clinical Impression: Left Ventricular Hypertrophy (LDH)   nsr       Imaging Results              X-Ray Chest 1 View (Final result)  Result time 07/31/23 12:01:48      Final result by Samuel Miranda DO (07/31/23 12:01:48)                   Impression:      Mild interstitial edema      Electronically signed by: Samuel Miranda  Date:    07/31/2023  Time:    12:01               Narrative:    EXAMINATION:  XR CHEST 1 VIEW    CLINICAL HISTORY:  ACUTE DYSPNEA;    TECHNIQUE:  XR CHEST 1 VIEW    COMPARISON:  None    FINDINGS:  No lines or tubes.    Mild diffuse interstitial opacities    Normal pleura.    Moderate cardiomegaly    No obvious acute bone findings.                                    X-Rays:   Independently Interpreted Readings:    Chest X-Ray: Cardiomegaly present.  Increased vascular markings consistent with CHF are present. 07/31/23 1204  X-Ray Chest 1 View   Performed: 07/31/23 1200  Final         Impression:  Mild interstitial edema Electronically signed by: Samuel Miranda Date: 07/31/2023 Time: 12:01            Medications   albuterol-ipratropium 2.5 mg-0.5 mg/3 mL nebulizer solution 3 mL (3 mLs Nebulization Given 7/31/23 1147)   furosemide injection 20 mg (20 mg Intravenous Given 7/31/23 1216)   nitroGLYCERIN SL tablet 0.4 mg (0.4 mg Sublingual Given 7/31/23 1341)     Medical Decision Making:   Initial Assessment:   PT IS A 57 YR OLD BM WITH DYSPNEA ONSET THIS AM WITH BASELINE PEDAL EDEMA. PT DENIES ADDITIONAL COMPLAINTS INCLUDING CP, COUGH, FEVER, CHILLS, HEMOPTYSIS, PALPITATIONS, OR SYNCOPE.  PT HAS HX ETOH CONSUMING 2-3 QUARTS  PER DAY FOR YEARS.  PT IS S/P CVA AND HAS COMPLETED PT/OT/REHAB AND IS ON ASA LOW DOSE.  PT IS ON BB AND STATES IS COMPLIANT WITH LAST DOSE THIS AM.    Differential Diagnosis:   BRONCHITIS, COVID,CHF WITH CARDIOMYOPATHY, PE, SEPSIS, UTI  ALCOHOLISM  ABNORMAL LABS, XRAY, EKG  Clinical Tests:   Lab Tests: Ordered  Radiological Study: Ordered  Medical Tests: Ordered  ED Management:  EXAM  XRAY CHF, CT PT DECLINES PREFERS TO WAIT ON THIS  ABG HYPOXIA 51/32/7.44 ON RA  EKG NSR  ER COURSE IMPROVED  DISPO  TRANSFER   PT REQUESTS [facility]    TRANSFER [facility] REQUESTED PER PT AS SHE WAS TREATED  PREVIOUSLY THERE FOR CVA  NO AVAILABLE BEDS AT [facility] AT PRESENT  1344 ACCEPTED PER DR GOVEA TELE BED A PER [facility] WITH EMS TRANSFER FOR CARDIAC EVALUATION                         Clinical Impression:   Final diagnoses:  [R06.00] Acute dyspnea  [R09.02] Hypoxia (Primary)  [I50.9] Congestive heart failure, unspecified HF chronicity, unspecified heart failure type  [N30.00] Acute cystitis without hematuria  [I10] Hypertension, unspecified type  [R79.89] Positive D dimer        ED Disposition Condition    Transfer to Another Facility  Stable                Marisol Flores MD  07/31/23 8820

## 2023-07-31 NOTE — ASSESSMENT & PLAN NOTE
"Patient is identified as having unknown heart failure that is Acute. CHF is currently uncontrolled due to Pulmonary edema/pleural effusion on CXR. Latest ECHO performed and demonstrates- No results found for this or any previous visit.    Continue Beta Blocker and Furosemide and monitor clinical status closely. Monitor on telemetry. Patient is off CHF pathway.  Monitor strict Is&Os and daily weights.  Place on fluid restriction of 1.5 L. Cardiology has not been consulted. Continue to stress to patient importance of self efficacy and  on diet for CHF. Last BNP reviewed- and noted below No results for input(s): "BNP", "BNPTRIAGEBLO" in the last 168 hours.     Shortness of breath with pBNP 454 and infiltrates vs edema on CXR. History of EtOH use and poorly controlled HTN.     - echocardiogram pending  - cardiology consult as indicated by echo results  "

## 2023-08-01 DIAGNOSIS — I10 ESSENTIAL HYPERTENSION: Primary | ICD-10-CM

## 2023-08-01 LAB
ANION GAP SERPL CALCULATED.3IONS-SCNC: 10 MMOL/L (ref 7–16)
BASOPHILS # BLD AUTO: 0.03 K/UL (ref 0–0.2)
BASOPHILS NFR BLD AUTO: 0.7 % (ref 0–1)
BUN SERPL-MCNC: 9 MG/DL (ref 7–18)
BUN/CREAT SERPL: 10 (ref 6–20)
CALCIUM SERPL-MCNC: 8.9 MG/DL (ref 8.5–10.1)
CHLORIDE SERPL-SCNC: 108 MMOL/L (ref 98–107)
CO2 SERPL-SCNC: 26 MMOL/L (ref 21–32)
CREAT SERPL-MCNC: 0.94 MG/DL (ref 0.55–1.02)
DIFFERENTIAL METHOD BLD: ABNORMAL
EGFR (NO RACE VARIABLE) (RUSH/TITUS): 70 ML/MIN/1.73M2
EOSINOPHIL # BLD AUTO: 0 K/UL (ref 0–0.5)
EOSINOPHIL NFR BLD AUTO: 0 % (ref 1–4)
ERYTHROCYTE [DISTWIDTH] IN BLOOD BY AUTOMATED COUNT: 14.2 % (ref 11.5–14.5)
GLUCOSE SERPL-MCNC: 88 MG/DL (ref 74–106)
HCT VFR BLD AUTO: 36.2 % (ref 38–47)
HGB BLD-MCNC: 11.9 G/DL (ref 12–16)
IMM GRANULOCYTES # BLD AUTO: 0.01 K/UL (ref 0–0.04)
IMM GRANULOCYTES NFR BLD: 0.2 % (ref 0–0.4)
LYMPHOCYTES # BLD AUTO: 2.04 K/UL (ref 1–4.8)
LYMPHOCYTES NFR BLD AUTO: 44.9 % (ref 27–41)
MCH RBC QN AUTO: 34.1 PG (ref 27–31)
MCHC RBC AUTO-ENTMCNC: 32.9 G/DL (ref 32–36)
MCV RBC AUTO: 103.7 FL (ref 80–96)
MONOCYTES # BLD AUTO: 0.43 K/UL (ref 0–0.8)
MONOCYTES NFR BLD AUTO: 9.5 % (ref 2–6)
MPC BLD CALC-MCNC: 10.6 FL (ref 9.4–12.4)
NEUTROPHILS # BLD AUTO: 2.03 K/UL (ref 1.8–7.7)
NEUTROPHILS NFR BLD AUTO: 44.7 % (ref 53–65)
NRBC # BLD AUTO: 0 X10E3/UL
NRBC, AUTO (.00): 0 %
PLATELET # BLD AUTO: 214 K/UL (ref 150–400)
POTASSIUM SERPL-SCNC: 3.5 MMOL/L (ref 3.5–5.1)
RBC # BLD AUTO: 3.49 M/UL (ref 4.2–5.4)
SODIUM SERPL-SCNC: 140 MMOL/L (ref 136–145)
WBC # BLD AUTO: 4.54 K/UL (ref 4.5–11)

## 2023-08-01 PROCEDURE — 99232 SBSQ HOSP IP/OBS MODERATE 35: CPT | Mod: ,,, | Performed by: FAMILY MEDICINE

## 2023-08-01 PROCEDURE — 99900035 HC TECH TIME PER 15 MIN (STAT)

## 2023-08-01 PROCEDURE — 80048 BASIC METABOLIC PNL TOTAL CA: CPT | Performed by: FAMILY MEDICINE

## 2023-08-01 PROCEDURE — 25000003 PHARM REV CODE 250: Performed by: FAMILY MEDICINE

## 2023-08-01 PROCEDURE — 63600175 PHARM REV CODE 636 W HCPCS: Performed by: FAMILY MEDICINE

## 2023-08-01 PROCEDURE — 27000221 HC OXYGEN, UP TO 24 HOURS

## 2023-08-01 PROCEDURE — 94761 N-INVAS EAR/PLS OXIMETRY MLT: CPT

## 2023-08-01 PROCEDURE — 85025 COMPLETE CBC W/AUTO DIFF WBC: CPT | Performed by: FAMILY MEDICINE

## 2023-08-01 PROCEDURE — 99232 PR SUBSEQUENT HOSPITAL CARE,LEVL II: ICD-10-PCS | Mod: ,,, | Performed by: FAMILY MEDICINE

## 2023-08-01 PROCEDURE — 11000001 HC ACUTE MED/SURG PRIVATE ROOM

## 2023-08-01 RX ADMIN — GUAIFENESIN AND DEXTROMETHORPHAN HYDROBROMIDE 1 TABLET: 30; 600 TABLET, EXTENDED RELEASE ORAL at 08:08

## 2023-08-01 RX ADMIN — FUROSEMIDE 40 MG: 10 INJECTION, SOLUTION INTRAMUSCULAR; INTRAVENOUS at 09:08

## 2023-08-01 RX ADMIN — CARVEDILOL 3.12 MG: 3.12 TABLET, FILM COATED ORAL at 09:08

## 2023-08-01 RX ADMIN — GUAIFENESIN AND DEXTROMETHORPHAN HYDROBROMIDE 1 TABLET: 30; 600 TABLET, EXTENDED RELEASE ORAL at 09:08

## 2023-08-01 RX ADMIN — CLORAZEPATE DIPOTASSIUM 7.5 MG: 3.75 TABLET ORAL at 09:08

## 2023-08-01 RX ADMIN — CLORAZEPATE DIPOTASSIUM 7.5 MG: 3.75 TABLET ORAL at 08:08

## 2023-08-01 RX ADMIN — Medication 9 MG: at 08:08

## 2023-08-01 RX ADMIN — ENOXAPARIN SODIUM 40 MG: 100 INJECTION SUBCUTANEOUS at 04:08

## 2023-08-01 RX ADMIN — CLORAZEPATE DIPOTASSIUM 7.5 MG: 3.75 TABLET ORAL at 03:08

## 2023-08-01 RX ADMIN — POLYETHYLENE GLYCOL 3350 17 G: 17 POWDER, FOR SOLUTION ORAL at 09:08

## 2023-08-01 RX ADMIN — DEXTROSE MONOHYDRATE 1 G: 5 INJECTION INTRAVENOUS at 09:08

## 2023-08-01 RX ADMIN — CARVEDILOL 3.12 MG: 3.12 TABLET, FILM COATED ORAL at 08:08

## 2023-08-01 NOTE — SUBJECTIVE & OBJECTIVE
"Past Medical History:   Diagnosis Date    Hypertension     Stroke        No past surgical history on file.    Review of patient's allergies indicates:   Allergen Reactions    Pcn [penicillins] Rash       No current facility-administered medications on file prior to encounter.     Current Outpatient Medications on File Prior to Encounter   Medication Sig    aspirin 81 MG Chew Take 81 mg by mouth once daily.    metoprolol tartrate (LOPRESSOR) 50 MG tablet Take 50 mg by mouth 2 (two) times a day.    pantoprazole (PROTONIX) 40 MG tablet Take 40 mg by mouth once daily.    levoFLOXacin (LEVAQUIN) 500 MG tablet Take 1 tablet (500 mg total) by mouth once daily. for 5 days     Family History    None       Tobacco Use    Smoking status: Never    Smokeless tobacco: Current     Types: Chew, Snuff   Substance and Sexual Activity    Alcohol use: Yes     Comment: beer "almost every day", states 2-3 quarts of beer a day but states "has slacked up now"    Drug use: Never    Sexual activity: Not on file     Review of Systems   Cardiovascular:  Positive for dyspnea on exertion, orthopnea and paroxysmal nocturnal dyspnea. Negative for chest pain, irregular heartbeat, leg swelling, near-syncope and palpitations.   Respiratory:  Positive for shortness of breath.    All other systems reviewed and are negative.    Objective:     Vital Signs (Most Recent):  Temp: 97.6 °F (36.4 °C) (08/01/23 1200)  Pulse: 80 (08/01/23 1200)  Resp: 18 (08/01/23 1200)  BP: (!) 141/82 (08/01/23 1200)  SpO2: 100 % (08/01/23 1200) Vital Signs (24h Range):  Temp:  [97.5 °F (36.4 °C)-98.5 °F (36.9 °C)] 97.6 °F (36.4 °C)  Pulse:  [76-97] 80  Resp:  [18-20] 18  SpO2:  [98 %-100 %] 100 %  BP: (140-203)/(70-93) 141/82        There is no height or weight on file to calculate BMI.    SpO2: 100 %       No intake or output data in the 24 hours ending 08/01/23 1609    Lines/Drains/Airways       Peripheral Intravenous Line  Duration                  Peripheral IV - Single " Lumen 22 G Anterior;Distal;Right Forearm -- days                     Physical Exam  Vitals reviewed.   Constitutional:       General: She is not in acute distress.     Appearance: She is obese.   HENT:      Head: Normocephalic and atraumatic.      Mouth/Throat:      Mouth: Mucous membranes are moist.   Eyes:      Extraocular Movements: Extraocular movements intact.      Conjunctiva/sclera: Conjunctivae normal.   Cardiovascular:      Rate and Rhythm: Normal rate and regular rhythm.      Pulses: Normal pulses.      Heart sounds: Murmur heard.      Comments: AORTIC MURMUR  Pulmonary:      Effort: Pulmonary effort is normal.      Breath sounds: Examination of the right-lower field reveals decreased breath sounds. Examination of the left-lower field reveals decreased breath sounds. Decreased breath sounds present.   Abdominal:      General: Bowel sounds are normal. There is no distension.      Palpations: Abdomen is soft.      Tenderness: There is no abdominal tenderness.   Musculoskeletal:         General: No swelling. Normal range of motion.      Cervical back: Normal range of motion and neck supple.   Skin:     General: Skin is warm and dry.      Capillary Refill: Capillary refill takes less than 2 seconds.   Neurological:      General: No focal deficit present.      Mental Status: She is alert and oriented to person, place, and time.      Cranial Nerves: No cranial nerve deficit.      Sensory: No sensory deficit.   Psychiatric:         Mood and Affect: Mood normal.         Behavior: Behavior normal.          Significant Labs: All pertinent lab results from the last 24 hours have been reviewed.    Significant Imaging: Echocardiogram: Transthoracic echo (TTE) complete (Cupid Only):   Results for orders placed or performed during the hospital encounter of 07/31/23   Echo   Result Value Ref Range    BSA 2.2 m2    LVOT stroke volume 58.49 cm3    LVIDd 4.99 3.5 - 6.0 cm    LV Systolic Volume 40.36 mL    LV Systolic Volume  Index 19.4 mL/m2    LVIDs 3.18 2.1 - 4.0 cm    LV Diastolic Volume 117.56 mL    LV Diastolic Volume Index 56.52 mL/m2    IVS 1.29 (A) 0.6 - 1.1 cm    LVOT diameter 1.97 cm    LVOT area 3.0 cm2    FS 36 28 - 44 %    Left Ventricle Relative Wall Thickness 0.50 cm    Posterior Wall 1.24 (A) 0.6 - 1.1 cm    TDI LATERAL 0.09 m/s    TDI SEPTAL 0.08 m/s    LV mass 251.04 g    LV Mass Index 121 g/m2    MV Peak E Aleksandar 0.74 m/s    LV LATERAL E/E' RATIO 8.22 m/s    LV SEPTAL E/E' RATIO 9.25 m/s    E/E' ratio 8.71 m/s    MV Peak A Aleksandar 0.65 m/s    TR Max Aleksandar 2.54 m/s    E/A ratio 1.14     Mean e' 0.09 m/s    E wave deceleration time 201.23 msec    LVOT peak aleksandar 0.93 m/s    Left Ventricular Outflow Tract Mean Velocity 0.65 cm/s    Left Ventricular Outflow Tract Mean Gradient 1.86 mmHg    LA size 3.81 cm    RVDD 3.61 cm    RA Major Axis 4.05 cm    AV regurgitation pressure 1/2 time [id number] ms    AR Max Aleksandar 4.31 m/s    AV mean gradient 4 mmHg    AV peak gradient 7 mmHg    Ao peak aleksandar 1.33 m/s    Ao VTI 21.90 cm    LVOT peak VTI 19.20 cm    AV valve area 2.67 cm²    AV Velocity Ratio 0.70     AV index (prosthetic) 0.88     MARK by Velocity Ratio 2.13 cm²    Mr max aleksandar 6.27 m/s    MV mean gradient 3 mmHg    MV peak gradient 7 mmHg    MV stenosis pressure 1/2 time 60.16 ms    MV valve area p 1/2 method 3.66 cm2    MV valve area by continuity eq 1.58 cm2    MV VTI 37.0 cm    TAPSE 1.84 cm    Triscuspid Valve Regurgitation Peak Gradient 26 mmHg    PV PEAK VELOCITY 1.19 m/s    PV peak gradient 6 mmHg    Ao root annulus 2.52 cm    ZLVIDS -1.60     ZLVIDD -2.44     AORTIC VALVE CUSP SEPERATION 1.79 cm    IVC diameter 1.46 cm

## 2023-08-01 NOTE — H&P
[facility]  [facility] Medicine  History & Physical    Patient Name: Kerri Morrow  MRN: [id number]  Patient Class: IP- Inpatient  Admission Date: 7/31/2023  Attending Physician: Jenifer De La Torre DO   Primary Care Provider: Primary Doctor No         Patient information was obtained from patient and ER records.     Subjective:     Principal Problem:Acute congestive heart failure    Chief Complaint: No chief complaint on file.       HPI: Kerri Morrow is a 57-year-old female with history of HTN and EtOH use disorder, received as a transfer from [facility]. Patient initially presented with shortness of breath which she states began suddenly this morning. States she has also noted a decreased tolerance to activity and is unable to lie flat to sleep due to shortness of breath. These symptoms have been going on for some time but patient unable to provide specific onset. She does have bilateral lower extremity edema which is chronic. She denies any chest pain, nausea, or diaphoresis.     Initial evaluation with the following: troponin WNL, pBNP 454 (baseline unknown), D-dimer 1.13, macrocytosis with Hgb/Hct WN, UA with WBC 11-15. CXR with mild interstitial edema. Significant HTN with vital signs otherwise unremarkable.     Patient to be admitted to hospital service for further evaluation and management.       Past Medical History:   Diagnosis Date    Hypertension     Stroke        No past surgical history on file.    Review of patient's allergies indicates:   Allergen Reactions    Pcn [penicillins] Rash       Current Facility-Administered Medications on File Prior to Encounter   Medication    [COMPLETED] albuterol-ipratropium 2.5 mg-0.5 mg/3 mL nebulizer solution 3 mL    [COMPLETED] furosemide injection 20 mg    [COMPLETED] levoFLOXacin tablet 500 mg    [COMPLETED] nitroGLYCERIN 2% TD oint ointment 1 inch    [COMPLETED] nitroGLYCERIN SL tablet 0.4 mg    [COMPLETED] nitroGLYCERIN SL tablet  "0.4 mg     Current Outpatient Medications on File Prior to Encounter   Medication Sig    aspirin 81 MG Chew Take 81 mg by mouth once daily.    metoprolol tartrate (LOPRESSOR) 50 MG tablet Take 50 mg by mouth 2 (two) times a day.    pantoprazole (PROTONIX) 40 MG tablet Take 40 mg by mouth once daily.    levoFLOXacin (LEVAQUIN) 500 MG tablet Take 1 tablet (500 mg total) by mouth once daily. for 5 days     Family History    None       Tobacco Use    Smoking status: Never    Smokeless tobacco: Current     Types: Chew, Snuff   Substance and Sexual Activity    Alcohol use: Yes     Comment: beer "almost every day", states 2-3 quarts of beer a day but states "has slacked up now"    Drug use: Never    Sexual activity: Not on file     Review of Systems   Constitutional:  Positive for fatigue. Negative for diaphoresis.   Respiratory:  Positive for shortness of breath. Negative for chest tightness and wheezing.    Cardiovascular:  Positive for leg swelling. Negative for chest pain and palpitations.        Orthopnea   Gastrointestinal:  Negative for nausea and vomiting.   Neurological:  Negative for dizziness, syncope and headaches.   All other systems reviewed and are negative.    Objective:     Vital Signs (Most Recent):  Pulse: 97 (07/31/23 1807)  BP: (!) 181/92 (07/31/23 1807) Vital Signs (24h Range):  Temp:  [98.5 °F (36.9 °C)] 98.5 °F (36.9 °C)  Pulse:  [80-97] 97  Resp:  [18-22] 20  SpO2:  [93 %-99 %] 98 %  BP: (179-218)/() 181/92        There is no height or weight on file to calculate BMI.     Physical Exam  Constitutional:       General: She is not in acute distress.     Appearance: Normal appearance. She is not ill-appearing.   HENT:      Head: Normocephalic and atraumatic.      Nose: Nose normal.   Eyes:      Conjunctiva/sclera: Conjunctivae normal.      Pupils: Pupils are equal, round, and reactive to light.   Cardiovascular:      Rate and Rhythm: Normal rate and regular rhythm.   Pulmonary:      " Effort: Pulmonary effort is normal. No respiratory distress.   Musculoskeletal:      Cervical back: No rigidity.      Right lower leg: No edema.      Left lower leg: No edema.   Skin:     Coloration: Skin is not jaundiced or pale.      Findings: No rash.   Neurological:      General: No focal deficit present.      Mental Status: She is alert and oriented to person, place, and time.   Psychiatric:         Mood and Affect: Affect normal. Mood is anxious.         Behavior: Behavior normal.         Thought Content: Thought content normal.              CRANIAL NERVES     CN III, IV, VI   Pupils are equal, round, and reactive to light.       Significant Labs: All pertinent labs within the past 24 hours have been reviewed.    Significant Imaging: I have reviewed all pertinent imaging results/findings within the past 24 hours.    Assessment/Plan:     Alcohol use disorder  Daily EtOH use, average 2-3 quarts of beer daily although reports she has been trying to cut back this amount.     - Tranxene scheduled  - Ativan PRN  - encourage cessation +/- rehab  -  for rehab if desired      Macrocytosis without anemia  Likely secondary to EtOH. B12 and folate pending.      Acute cystitis without hematuria  Urinalysis with WBC 11-15.     - Rocephin 1g IV  - urine culture pending      Acute congestive heart failure  Patient is identified as having unknown heart failure that is Acute. CHF is currently uncontrolled due to Pulmonary edema/pleural effusion on CXR. Latest ECHO performed and demonstrates- No results found for this or any previous visit.    Continue Beta Blocker and Furosemide and monitor clinical status closely. Monitor on telemetry. Patient is off CHF pathway.  Monitor strict Is&Os and daily weights.  Place on fluid restriction of 1.5 L. Cardiology has not been consulted. Continue to stress to patient importance of self efficacy and  on diet for CHF. Last BNP reviewed- and noted below No results for  "input(s): "BNP", "BNPTRIAGEBLO" in the last 168 hours.     Shortness of breath with pBNP 454 and infiltrates vs edema on CXR. History of EtOH use and poorly controlled HTN.     - echocardiogram pending  - cardiology consult as indicated by echo results    Shortness of breath  Secondary to CHF vs PE vs other. Heart failure most likely, CXR with infiltrates vs edema and pBNP elevated. Further evaluation pending, continue O2 and albuterol prn.       Positive D dimer  Secondary to inflammation (UTI) vs DVT/PE vs other. Does have new shortness of breath, lower extremity edema however this is chronic. No hypoxemia or tachycardia, Wells criteria low probability.    - LE doppler pending   - DVT prophylaxis       VTE Risk Mitigation (From admission, onward)         Ordered     enoxaparin injection 40 mg  Every 24 hours         07/31/23 1651     Reason for no Mechanical VTE Prophylaxis  Once        Question:  Reasons:  Answer:  Physician Provided (leave comment)  Comment:  DVT r/o    07/31/23 1651     IP VTE HIGH RISK PATIENT  Once         07/31/23 1651                           Jenifer De La Torre DO  Department of Hospital Medicine  [facility]  "

## 2023-08-01 NOTE — SUBJECTIVE & OBJECTIVE
"Past Medical History:   Diagnosis Date    Hypertension     Stroke        No past surgical history on file.    Review of patient's allergies indicates:   Allergen Reactions    Pcn [penicillins] Rash       Current Facility-Administered Medications on File Prior to Encounter   Medication    [COMPLETED] albuterol-ipratropium 2.5 mg-0.5 mg/3 mL nebulizer solution 3 mL    [COMPLETED] furosemide injection 20 mg    [COMPLETED] levoFLOXacin tablet 500 mg    [COMPLETED] nitroGLYCERIN 2% TD oint ointment 1 inch    [COMPLETED] nitroGLYCERIN SL tablet 0.4 mg    [COMPLETED] nitroGLYCERIN SL tablet 0.4 mg     Current Outpatient Medications on File Prior to Encounter   Medication Sig    aspirin 81 MG Chew Take 81 mg by mouth once daily.    metoprolol tartrate (LOPRESSOR) 50 MG tablet Take 50 mg by mouth 2 (two) times a day.    pantoprazole (PROTONIX) 40 MG tablet Take 40 mg by mouth once daily.    levoFLOXacin (LEVAQUIN) 500 MG tablet Take 1 tablet (500 mg total) by mouth once daily. for 5 days     Family History    None       Tobacco Use    Smoking status: Never    Smokeless tobacco: Current     Types: Chew, Snuff   Substance and Sexual Activity    Alcohol use: Yes     Comment: beer "almost every day", states 2-3 quarts of beer a day but states "has slacked up now"    Drug use: Never    Sexual activity: Not on file     Review of Systems   Constitutional:  Positive for fatigue. Negative for diaphoresis.   Respiratory:  Positive for shortness of breath. Negative for chest tightness and wheezing.    Cardiovascular:  Positive for leg swelling. Negative for chest pain and palpitations.        Orthopnea   Gastrointestinal:  Negative for nausea and vomiting.   Neurological:  Negative for dizziness, syncope and headaches.   All other systems reviewed and are negative.    Objective:     Vital Signs (Most Recent):  Pulse: 97 (07/31/23 1807)  BP: (!) 181/92 (07/31/23 1807) Vital Signs (24h Range):  Temp:  [98.5 °F (36.9 °C)] 98.5 °F (36.9 " °C)  Pulse:  [80-97] 97  Resp:  [18-22] 20  SpO2:  [93 %-99 %] 98 %  BP: (179-218)/() 181/92        There is no height or weight on file to calculate BMI.     Physical Exam  Constitutional:       General: She is not in acute distress.     Appearance: Normal appearance. She is not ill-appearing.   HENT:      Head: Normocephalic and atraumatic.      Nose: Nose normal.   Eyes:      Conjunctiva/sclera: Conjunctivae normal.      Pupils: Pupils are equal, round, and reactive to light.   Cardiovascular:      Rate and Rhythm: Normal rate and regular rhythm.   Pulmonary:      Effort: Pulmonary effort is normal. No respiratory distress.   Musculoskeletal:      Cervical back: No rigidity.      Right lower leg: No edema.      Left lower leg: No edema.   Skin:     Coloration: Skin is not jaundiced or pale.      Findings: No rash.   Neurological:      General: No focal deficit present.      Mental Status: She is alert and oriented to person, place, and time.   Psychiatric:         Mood and Affect: Affect normal. Mood is anxious.         Behavior: Behavior normal.         Thought Content: Thought content normal.              CRANIAL NERVES     CN III, IV, VI   Pupils are equal, round, and reactive to light.       Significant Labs: All pertinent labs within the past 24 hours have been reviewed.    Significant Imaging: I have reviewed all pertinent imaging results/findings within the past 24 hours.

## 2023-08-01 NOTE — PLAN OF CARE
[facility]  Initial Discharge Assessment       Primary Care Provider: Primary Doctor No    Admission Diagnosis: Congestive heart failure [I50.9]    Admission Date: 7/31/2023  Expected Discharge Date:     Transition of Care Barriers: None    Payor: HU[employer] MANAGED MEDICARE / Plan: HU[employer]NA MEDICARE PPO / Product Type: Medicare Advantage /     Extended Emergency Contact Information  Primary Emergency Contact: Dixon Morrow  Mobile Phone: [phone]  Relation: Spouse  Preferred language: English   needed? No    Discharge Plan A: Home with family, Home Health  Discharge Plan B: Home with family, Home Health      [facility] - [place] - [address] [address]  [address] [address]  [facility] [id number]  Phone: [phone] Fax: [phone]    [facility] [address]  [address] [facility] [address]  Phone: [phone] Fax: [phone]      Initial Assessment (most recent)       Adult Discharge Assessment - 08/01/23 1019          Discharge Assessment    Assessment Type Discharge Planning Assessment     Source of Information patient     People in Home spouse     Do you expect to return to your current living situation? Yes     Do you have help at home or someone to help you manage your care at home? Yes     Who are your caregiver(s) and their phone number(s)? dixon morrwo spouse [phone]     Prior to hospitilization cognitive status: Alert/Oriented     Current cognitive status: Alert/Oriented     Walking or Climbing Stairs ambulation difficulty, requires equipment;stair climbing difficulty, requires equipment     Mobility Management cane     Home Layout Able to live on 1st floor     Equipment Currently Used at Home cane, straight     Patient currently being followed by outpatient case management? No     Do you currently have service(s) that help you manage your care at home? Yes     Name and Contact number of agency unsure but wants to continue, ss  will find out which company     Is the pt/caregiver preference to resume services with current agency Yes     Do you take prescription medications? Yes     Do you have prescription coverage? Yes     Do you have any problems affording any of your prescribed medications? No     Who is going to help you get home at discharge? spouse     How do you get to doctors appointments? family or friend will provide     Are you on dialysis? No     Do you take coumadin? No     Discharge Plan A Home with family;Home Health     Discharge Plan B Home with family;Home Health     DME Needed Upon Discharge  none     Discharge Plan discussed with: Patient     Transition of Care Barriers None        Physical Activity    On average, how many days per week do you engage in moderate to strenuous exercise (like a brisk walk)? 0 days     On average, how many minutes do you engage in exercise at this level? 0 min        Financial Resource Strain    How hard is it for you to pay for the very basics like food, housing, medical care, and heating? Not hard at all        Housing Stability    In the last 12 months, was there a time when you were not able to pay the mortgage or rent on time? No     In the last 12 months, how many places have you lived? 1     In the last 12 months, was there a time when you did not have a steady place to sleep or slept in a shelter (including now)? No        Transportation Needs    In the past 12 months, has lack of transportation kept you from medical appointments or from getting medications? No     In the past 12 months, has lack of transportation kept you from meetings, work, or from getting things needed for daily living? No        Food Insecurity    Within the past 12 months, you worried that your food would run out before you got the money to buy more. Never true     Within the past 12 months, the food you bought just didn't last and you didn't have money to get more. Never true        Stress    Do you feel  stress - tense, restless, nervous, or anxious, or unable to sleep at night because your mind is troubled all the time - these days? Not at all        Social Connections    In a typical week, how many times do you talk on the phone with family, friends, or neighbors? More than three times a week     How often do you get together with friends or relatives? More than three times a week     How often do you attend Orthodox or Spiritism services? More than 4 times per year     Do you belong to any clubs or organizations such as Orthodox groups, unions, fraliveBooks or athletic groups, or school groups? Yes     How often do you attend meetings of the clubs or organizations you belong to? More than 4 times per year     Are you , , , , never , or living with a partner?         Alcohol Use    Q1: How often do you have a drink containing alcohol? 4 or more times a week     Q2: How many drinks containing alcohol do you have on a typical day when you are drinking? 5 or 6     Q3: How often do you have six or more drinks on one occasion? Daily or almost daily                 Pt lives home with spouse, current with hh but not sure of company but wants to continue and wants ss to find out which hh, has cane for home use, [facility] completed, dc plan home with hh, following for needs

## 2023-08-01 NOTE — PLAN OF CARE
Pt current with [facility], [phone], ss faxed clinicals, will notifiy at dc

## 2023-08-01 NOTE — NURSING
Have paged Dr. Thompson and sent Secure chat to Dr. Thompson.  Awaiting response/ call.  Secure Chat to MARJORIE Correa Charge Nurse this morning to follow up.

## 2023-08-01 NOTE — CONSULTS
[facility]  Cardiology  Consult Note    Patient Name: Kerri Morrow  MRN: [id number]  Admission Date: 7/31/2023  Hospital Length of Stay: 1 days  Code Status: Full Code   Attending Provider: Jenifer De La Torre DO   Consulting Provider: MARTIN Mckeon  Primary Care Physician: Primary Doctor No  Principal Problem:Acute congestive heart failure    Patient information was obtained from patient, past medical records and ER records.     Inpatient consult to Cardiology  Consult performed by: MARTIN Mckeon  Consult ordered by: Jenifer De La Torre DO  Reason for consult: New onset CHF        Subjective:     Chief Complaint: Dyspnea     HPI:   56 y/o female with a past medical hx of CVA, HTN, Alcohol dependance who is seen in consult today for signs and symptoms of CHF. Patient admitted to [facility] in transfer from [facility] after presenting to the ED with dyspnea. Dyspnea ongoing for several weeks with worsening yesterday morning and was exacerbated with exertion. Patient also endorses orthopnea and PND recently and is minimally physically active due to CVA with residual to left arm and leg. No reported nausea, vomiting, headache, heartburn, chest pain, diaphoresis, decreased appetite, palpitations, dizziness, fever, syncope or recent illness. Denies illegal drug use however, uses smokeless tobacco and is a daily drinker of 1-2 quarts of beer a day. Family at bedside reports patient does snore at night and patient has had poorly controlled HTN.    Workup: Ekg: SR with non specific ST abnormalities and LVH, pBnp 454, D-dimer 1.13, HTN urgency with presentation to ED      Past Medical History:   Diagnosis Date    Hypertension     Stroke        No past surgical history on file.    Review of patient's allergies indicates:   Allergen Reactions    Pcn [penicillins] Rash       No current facility-administered medications on file prior to encounter.     Current Outpatient Medications on File Prior  "to Encounter   Medication Sig    aspirin 81 MG Chew Take 81 mg by mouth once daily.    metoprolol tartrate (LOPRESSOR) 50 MG tablet Take 50 mg by mouth 2 (two) times a day.    pantoprazole (PROTONIX) 40 MG tablet Take 40 mg by mouth once daily.    levoFLOXacin (LEVAQUIN) 500 MG tablet Take 1 tablet (500 mg total) by mouth once daily. for 5 days     Family History    None       Tobacco Use    Smoking status: Never    Smokeless tobacco: Current     Types: Chew, Snuff   Substance and Sexual Activity    Alcohol use: Yes     Comment: beer "almost every day", states 2-3 quarts of beer a day but states "has slacked up now"    Drug use: Never    Sexual activity: Not on file     Review of Systems   Cardiovascular:  Positive for dyspnea on exertion, orthopnea and paroxysmal nocturnal dyspnea. Negative for chest pain, irregular heartbeat, leg swelling, near-syncope and palpitations.   Respiratory:  Positive for shortness of breath.    All other systems reviewed and are negative.    Objective:     Vital Signs (Most Recent):  Temp: 97.6 °F (36.4 °C) (08/01/23 1200)  Pulse: 80 (08/01/23 1200)  Resp: 18 (08/01/23 1200)  BP: (!) 141/82 (08/01/23 1200)  SpO2: 100 % (08/01/23 1200) Vital Signs (24h Range):  Temp:  [97.5 °F (36.4 °C)-98.5 °F (36.9 °C)] 97.6 °F (36.4 °C)  Pulse:  [76-97] 80  Resp:  [18-20] 18  SpO2:  [98 %-100 %] 100 %  BP: (140-203)/(70-93) 141/82        There is no height or weight on file to calculate BMI.    SpO2: 100 %       No intake or output data in the 24 hours ending 08/01/23 1609    Lines/Drains/Airways       Peripheral Intravenous Line  Duration                  Peripheral IV - Single Lumen 22 G Anterior;Distal;Right Forearm -- days                     Physical Exam  Vitals reviewed.   Constitutional:       General: She is not in acute distress.     Appearance: She is obese.   HENT:      Head: Normocephalic and atraumatic.      Mouth/Throat:      Mouth: Mucous membranes are moist.   Eyes:      " Extraocular Movements: Extraocular movements intact.      Conjunctiva/sclera: Conjunctivae normal.   Cardiovascular:      Rate and Rhythm: Normal rate and regular rhythm.      Pulses: Normal pulses.      Heart sounds: Murmur heard.      Comments: AORTIC MURMUR  Pulmonary:      Effort: Pulmonary effort is normal.      Breath sounds: Examination of the right-lower field reveals decreased breath sounds. Examination of the left-lower field reveals decreased breath sounds. Decreased breath sounds present.   Abdominal:      General: Bowel sounds are normal. There is no distension.      Palpations: Abdomen is soft.      Tenderness: There is no abdominal tenderness.   Musculoskeletal:         General: No swelling. Normal range of motion.      Cervical back: Normal range of motion and neck supple.   Skin:     General: Skin is warm and dry.      Capillary Refill: Capillary refill takes less than 2 seconds.   Neurological:      General: No focal deficit present.      Mental Status: She is alert and oriented to person, place, and time.      Cranial Nerves: No cranial nerve deficit.      Sensory: No sensory deficit.   Psychiatric:         Mood and Affect: Mood normal.         Behavior: Behavior normal.          Significant Labs: All pertinent lab results from the last 24 hours have been reviewed.    Significant Imaging: Echocardiogram: Transthoracic echo (TTE) complete (Cupid Only):   Results for orders placed or performed during the hospital encounter of 07/31/23   Echo   Result Value Ref Range    BSA 2.2 m2    LVOT stroke volume 58.49 cm3    LVIDd 4.99 3.5 - 6.0 cm    LV Systolic Volume 40.36 mL    LV Systolic Volume Index 19.4 mL/m2    LVIDs 3.18 2.1 - 4.0 cm    LV Diastolic Volume 117.56 mL    LV Diastolic Volume Index 56.52 mL/m2    IVS 1.29 (A) 0.6 - 1.1 cm    LVOT diameter 1.97 cm    LVOT area 3.0 cm2    FS 36 28 - 44 %    Left Ventricle Relative Wall Thickness 0.50 cm    Posterior Wall 1.24 (A) 0.6 - 1.1 cm    TDI  LATERAL 0.09 m/s    TDI SEPTAL 0.08 m/s    LV mass 251.04 g    LV Mass Index 121 g/m2    MV Peak E Aleksandar 0.74 m/s    LV LATERAL E/E' RATIO 8.22 m/s    LV SEPTAL E/E' RATIO 9.25 m/s    E/E' ratio 8.71 m/s    MV Peak A Aleksandar 0.65 m/s    TR Max Aleksandar 2.54 m/s    E/A ratio 1.14     Mean e' 0.09 m/s    E wave deceleration time 201.23 msec    LVOT peak aleksandar 0.93 m/s    Left Ventricular Outflow Tract Mean Velocity 0.65 cm/s    Left Ventricular Outflow Tract Mean Gradient 1.86 mmHg    LA size 3.81 cm    RVDD 3.61 cm    RA Major Axis 4.05 cm    AV regurgitation pressure 1/2 time [id number] ms    AR Max Aleksandar 4.31 m/s    AV mean gradient 4 mmHg    AV peak gradient 7 mmHg    Ao peak aleksandar 1.33 m/s    Ao VTI 21.90 cm    LVOT peak VTI 19.20 cm    AV valve area 2.67 cm²    AV Velocity Ratio 0.70     AV index (prosthetic) 0.88     MARK by Velocity Ratio 2.13 cm²    Mr max aleksandar 6.27 m/s    MV mean gradient 3 mmHg    MV peak gradient 7 mmHg    MV stenosis pressure 1/2 time 60.16 ms    MV valve area p 1/2 method 3.66 cm2    MV valve area by continuity eq 1.58 cm2    MV VTI 37.0 cm    TAPSE 1.84 cm    Triscuspid Valve Regurgitation Peak Gradient 26 mmHg    PV PEAK VELOCITY 1.19 m/s    PV peak gradient 6 mmHg    Ao root annulus 2.52 cm    ZLVIDS -1.60     ZLVIDD -2.44     AORTIC VALVE CUSP SEPERATION 1.79 cm    IVC diameter 1.46 cm     Assessment and Plan:     * Acute congestive heart failure  8/1/23  - Cardiac monitoring - Echo pending - Ekg: SR with non specific ST abnormalities and LVH  - Cxr: Mild interstitial edema - Troponin negative  - NM stress test in a.m.   - Lipid panel/Tsh  pending - pBnp 454  - Daily BMP - IV Lasix - GDMT pending echo results  - Low Sodium diet - Strict Intake and Output q shift - Daily Weights   - CHF Education- Dietician consult - - Cardiac rehab ( consult) - GDMT as indicated pending echo results    - Educated on medication adherence, blood pressure control, stress reduction, cholesterol,  tobacco cessation, weight management, diet, diabetes, physical activity, daily weight monitoring at home to detect fluid accumulation before it becomes symptomatic, and other risk factor modifications       Shortness of breath  8/1/23  Likely 2/2 to CHF in setting of  Uncontrolled HTN, ETOH and aortic murmur    Positive D dimer  8/1/23  - US bilateral lower ext. Negative for DVT  - D-dimer 1.13        VTE Risk Mitigation (From admission, onward)         Ordered     enoxaparin injection 40 mg  Every 24 hours         07/31/23 1651     Reason for no Mechanical VTE Prophylaxis  Once        Question:  Reasons:  Answer:  Physician Provided (leave comment)  Comment:  DVT r/o    07/31/23 1651     IP VTE HIGH RISK PATIENT  Once         07/31/23 1651                Thank you for your consult. I will follow-up with patient. Please contact us if you have any additional questions.    Rod Bolton, ACNP  Cardiology   [facility]

## 2023-08-01 NOTE — ASSESSMENT & PLAN NOTE
Bilateral LE doppler with no evidence of DVT.  ---  Secondary to inflammation (UTI) vs DVT/PE vs other. Does have new shortness of breath, lower extremity edema however this is chronic. No hypoxemia or tachycardia, Wells criteria low probability.    - LE doppler pending   - DVT prophylaxis

## 2023-08-01 NOTE — PROGRESS NOTES
[facility]  [facility]  Progress Note    Patient Name: Kerri Morrow  MRN: [id number]  Patient Class: IP- Inpatient   Admission Date: 7/31/2023  Length of Stay: 1 days  Attending Physician: Jenifer De La Torre DO  Primary Care Provider: Primary Doctor No        Subjective:     Principal Problem:Acute congestive heart failure        HPI:  Kerri Morrow is a 57-year-old female with history of HTN and EtOH use disorder, received as a transfer from [facility]. Patient initially presented with shortness of breath which she states began suddenly this morning. States she has also noted a decreased tolerance to activity and is unable to lie flat to sleep due to shortness of breath. These symptoms have been going on for some time but patient unable to provide specific onset. She does have bilateral lower extremity edema which is chronic. She denies any chest pain, nausea, or diaphoresis.     Initial evaluation with the following: troponin WNL, pBNP 454 (baseline unknown), D-dimer 1.13, macrocytosis with Hgb/Hct WN, UA with WBC 11-15. CXR with mild interstitial edema. Significant HTN with vital signs otherwise unremarkable.     Patient to be admitted to hospital service for further evaluation and management.       Overview/Hospital Course:  No notes on file    Interval History:    No significant events overnight. Patient with no complaints today, states symptoms have improved. Echocardiogram completed this AM with report pending. Cardiology consulted.    Review of Systems   Constitutional:  Positive for fatigue. Negative for diaphoresis.   Respiratory:  Negative for chest tightness, shortness of breath and wheezing.    Cardiovascular:  Positive for leg swelling. Negative for chest pain and palpitations.        Orthopnea   Gastrointestinal:  Negative for nausea and vomiting.   Neurological:  Negative for dizziness, syncope and headaches.   All other systems reviewed and are  negative.    Objective:     Vital Signs (Most Recent):  Temp: 98.4 °F (36.9 °C) (08/01/23 1600)  Pulse: 87 (08/01/23 1600)  Resp: 18 (08/01/23 1600)  BP: (!) 153/79 (08/01/23 1600)  SpO2: 99 % (08/01/23 1600) Vital Signs (24h Range):  Temp:  [97.5 °F (36.4 °C)-98.5 °F (36.9 °C)] 98.4 °F (36.9 °C)  Pulse:  [76-97] 87  Resp:  [18-20] 18  SpO2:  [98 %-100 %] 99 %  BP: (140-203)/(70-93) 153/79        There is no height or weight on file to calculate BMI.     Physical Exam  Constitutional:       General: She is not in acute distress.     Appearance: Normal appearance. She is not ill-appearing.   HENT:      Head: Normocephalic and atraumatic.      Nose: Nose normal.   Eyes:      Conjunctiva/sclera: Conjunctivae normal.      Pupils: Pupils are equal, round, and reactive to light.   Cardiovascular:      Rate and Rhythm: Normal rate and regular rhythm.   Pulmonary:      Effort: Pulmonary effort is normal. No respiratory distress.   Musculoskeletal:      Cervical back: No rigidity.      Right lower leg: No edema.      Left lower leg: No edema.   Skin:     Coloration: Skin is not jaundiced or pale.      Findings: No rash.   Neurological:      General: No focal deficit present.      Mental Status: She is alert and oriented to person, place, and time.   Psychiatric:         Mood and Affect: Mood and affect normal.         Behavior: Behavior normal.         Thought Content: Thought content normal.              CRANIAL NERVES     CN III, IV, VI   Pupils are equal, round, and reactive to light.       Significant Labs: All pertinent labs within the past 24 hours have been reviewed.    Significant Imaging: I have reviewed all pertinent imaging results/findings within the past 24 hours.      Assessment/Plan:      * Acute congestive heart failure  Echocardiogram completed with report pending. Cardiology consulted.  ---  Patient is identified as having unknown heart failure that is Acute. CHF is currently uncontrolled due to Pulmonary  "edema/pleural effusion on CXR. Latest ECHO performed and demonstrates- No results found for this or any previous visit.    Continue Beta Blocker and Furosemide and monitor clinical status closely. Monitor on telemetry. Patient is off CHF pathway.  Monitor strict Is&Os and daily weights.  Place on fluid restriction of 1.5 L. Cardiology has not been consulted. Continue to stress to patient importance of self efficacy and  on diet for CHF. Last BNP reviewed- and noted below No results for input(s): "BNP", "BNPTRIAGEBLO" in the last 168 hours.     Shortness of breath with pBNP 454 and infiltrates vs edema on CXR. History of EtOH use and poorly controlled HTN.     - echocardiogram pending  - cardiology consult as indicated by echo results    Alcohol use disorder  Daily EtOH use, average 2-3 quarts of beer daily although reports she has been trying to cut back this amount.     - Tranxene scheduled  - Ativan PRN  - encourage cessation +/- rehab  -  for rehab if desired      Macrocytosis without anemia  Likely secondary to EtOH. B12 and folate pending.      Acute cystitis without hematuria  Urinalysis with WBC 11-15.     - Rocephin 1g IV  - urine culture pending      Shortness of breath  Secondary to CHF vs PE vs other. Heart failure most likely, CXR with infiltrates vs edema and pBNP elevated. Further evaluation pending, continue O2 and albuterol prn.       Positive D dimer  Bilateral LE doppler with no evidence of DVT.  ---  Secondary to inflammation (UTI) vs DVT/PE vs other. Does have new shortness of breath, lower extremity edema however this is chronic. No hypoxemia or tachycardia, Wells criteria low probability.    - LE doppler pending   - DVT prophylaxis       VTE Risk Mitigation (From admission, onward)         Ordered     enoxaparin injection 40 mg  Every 24 hours         07/31/23 5759     Reason for no Mechanical VTE Prophylaxis  Once        Question:  Reasons:  Answer:  Physician Provided " (leave comment)  Comment:  DVT r/o    07/31/23 1651     IP VTE HIGH RISK PATIENT  Once         07/31/23 1651                Discharge Planning   STEW:      Code Status: Full Code   Is the patient medically ready for discharge?:     Reason for patient still in hospital (select all that apply): Treatment  Discharge Plan A: Home with family, Home Health                  Jenifer De La Torre DO  Department of Hospital Medicine   [facility]

## 2023-08-01 NOTE — HPI
58 y/o female with a past medical hx of CVA, HTN, Alcohol dependance who is seen in consult today for signs and symptoms of CHF. Patient admitted to [facility] in transfer from [facility] after presenting to the ED with dyspnea. Dyspnea ongoing for several weeks with worsening yesterday morning and was exacerbated with exertion. Patient also endorses orthopnea and PND recently and is minimally physically active due to CVA with residual to left arm and leg. No reported nausea, vomiting, headache, heartburn, chest pain, diaphoresis, decreased appetite, palpitations, dizziness, fever, syncope or recent illness. Denies illegal drug use however, uses smokeless tobacco and is a daily drinker of 1-2 quarts of beer a day. Family at bedside reports patient does snore at night and patient has had poorly controlled HTN.    Workup: Ekg: SR with non specific ST abnormalities and LVH, pBnp 454, D-dimer 1.13, HTN urgency with presentation to ED

## 2023-08-01 NOTE — ASSESSMENT & PLAN NOTE
8/1/23  - Cardiac monitoring - Echo pending - Ekg: SR with non specific ST abnormalities and LVH  - Cxr: Mild interstitial edema - Troponin negative  - NM stress test in a.m.   - Lipid panel/Tsh  pending - pBnp 454  - Daily BMP - IV Lasix - GDMT pending echo results  - Low Sodium diet - Strict Intake and Output q shift - Daily Weights   - CHF Education- Dietician consult - - Cardiac rehab ( consult) - GDMT as indicated pending echo results    - Educated on medication adherence, blood pressure control, stress reduction, cholesterol, tobacco cessation, weight management, diet, diabetes, physical activity, daily weight monitoring at home to detect fluid accumulation before it becomes symptomatic, and other risk factor modifications

## 2023-08-01 NOTE — SUBJECTIVE & OBJECTIVE
Interval History:    No significant events overnight. Patient with no complaints today, states symptoms have improved. Echocardiogram completed this AM with report pending. Cardiology consulted.    Review of Systems   Constitutional:  Positive for fatigue. Negative for diaphoresis.   Respiratory:  Negative for chest tightness, shortness of breath and wheezing.    Cardiovascular:  Positive for leg swelling. Negative for chest pain and palpitations.        Orthopnea   Gastrointestinal:  Negative for nausea and vomiting.   Neurological:  Negative for dizziness, syncope and headaches.   All other systems reviewed and are negative.    Objective:     Vital Signs (Most Recent):  Temp: 98.4 °F (36.9 °C) (08/01/23 1600)  Pulse: 87 (08/01/23 1600)  Resp: 18 (08/01/23 1600)  BP: (!) 153/79 (08/01/23 1600)  SpO2: 99 % (08/01/23 1600) Vital Signs (24h Range):  Temp:  [97.5 °F (36.4 °C)-98.5 °F (36.9 °C)] 98.4 °F (36.9 °C)  Pulse:  [76-97] 87  Resp:  [18-20] 18  SpO2:  [98 %-100 %] 99 %  BP: (140-203)/(70-93) 153/79        There is no height or weight on file to calculate BMI.     Physical Exam  Constitutional:       General: She is not in acute distress.     Appearance: Normal appearance. She is not ill-appearing.   HENT:      Head: Normocephalic and atraumatic.      Nose: Nose normal.   Eyes:      Conjunctiva/sclera: Conjunctivae normal.      Pupils: Pupils are equal, round, and reactive to light.   Cardiovascular:      Rate and Rhythm: Normal rate and regular rhythm.   Pulmonary:      Effort: Pulmonary effort is normal. No respiratory distress.   Musculoskeletal:      Cervical back: No rigidity.      Right lower leg: No edema.      Left lower leg: No edema.   Skin:     Coloration: Skin is not jaundiced or pale.      Findings: No rash.   Neurological:      General: No focal deficit present.      Mental Status: She is alert and oriented to person, place, and time.   Psychiatric:         Mood and Affect: Mood and affect normal.          Behavior: Behavior normal.         Thought Content: Thought content normal.              CRANIAL NERVES     CN III, IV, VI   Pupils are equal, round, and reactive to light.       Significant Labs: All pertinent labs within the past 24 hours have been reviewed.    Significant Imaging: I have reviewed all pertinent imaging results/findings within the past 24 hours.

## 2023-08-01 NOTE — PLAN OF CARE
Problem: Adult Inpatient Plan of Care  Goal: Plan of Care Review  Outcome: Ongoing, Progressing  Goal: Patient-Specific Goal (Individualized)  Outcome: Ongoing, Progressing  Goal: Absence of Hospital-Acquired Illness or Injury  Outcome: Ongoing, Progressing  Goal: Optimal Comfort and Wellbeing  Outcome: Ongoing, Progressing  Goal: Readiness for Transition of Care  Outcome: Ongoing, Progressing     Problem: Bariatric Environmental Safety  Goal: Safety Maintained with Care  Outcome: Ongoing, Progressing     Problem: Skin Injury Risk Increased  Goal: Skin Health and Integrity  Outcome: Ongoing, Progressing     Problem: Fall Injury Risk  Goal: Absence of Fall and Fall-Related Injury  Outcome: Ongoing, Progressing     Problem: Gas Exchange Impaired  Goal: Optimal Gas Exchange  Outcome: Ongoing, Progressing

## 2023-08-01 NOTE — NURSING
Nurses Note -- 4 Eyes      8/1/2023   4:52 AM      Skin assessed during: Q Shift Change      [] No Altered Skin Integrity Present    [x]Prevention Measures Documented      [] Yes- Altered Skin Integrity Present or Discovered   [] LDA Added if Not in Epic (Describe Wound)   [] New Altered Skin Integrity was Present on Admit and Documented in LDA   [] Wound Image Taken    Wound Care Consulted? No    Attending Nurse:  Argentina Alvarado RN     Second RN/Staff Member:  MARJORIE Louise

## 2023-08-01 NOTE — HPI
Kerri Morrow is a 57-year-old female with history of HTN and EtOH use disorder, received as a transfer from [facility]. Patient initially presented with shortness of breath which she states began suddenly this morning. States she has also noted a decreased tolerance to activity and is unable to lie flat to sleep due to shortness of breath. These symptoms have been going on for some time but patient unable to provide specific onset. She does have bilateral lower extremity edema which is chronic. She denies any chest pain, nausea, or diaphoresis.     Initial evaluation with the following: troponin WNL, pBNP 454 (baseline unknown), D-dimer 1.13, macrocytosis with Hgb/Hct WN, UA with WBC 11-15. CXR with mild interstitial edema. Significant HTN with vital signs otherwise unremarkable.     Patient to be admitted to hospital service for further evaluation and management.

## 2023-08-01 NOTE — ASSESSMENT & PLAN NOTE
"Echocardiogram completed with report pending. Cardiology consulted.  ---  Patient is identified as having unknown heart failure that is Acute. CHF is currently uncontrolled due to Pulmonary edema/pleural effusion on CXR. Latest ECHO performed and demonstrates- No results found for this or any previous visit.    Continue Beta Blocker and Furosemide and monitor clinical status closely. Monitor on telemetry. Patient is off CHF pathway.  Monitor strict Is&Os and daily weights.  Place on fluid restriction of 1.5 L. Cardiology has not been consulted. Continue to stress to patient importance of self efficacy and  on diet for CHF. Last BNP reviewed- and noted below No results for input(s): "BNP", "BNPTRIAGEBLO" in the last 168 hours.     Shortness of breath with pBNP 454 and infiltrates vs edema on CXR. History of EtOH use and poorly controlled HTN.     - echocardiogram pending  - cardiology consult as indicated by echo results  "

## 2023-08-02 LAB
AORTIC ROOT ANNULUS: 2.52 CM
AORTIC VALVE CUSP SEPERATION: 1.79 CM
AV INDEX (PROSTH): 0.88
AV MEAN GRADIENT: 4 MMHG
AV PEAK GRADIENT: 7 MMHG
AV REGURGITATION PRESSURE HALF TIME: 646.45 MS
AV VALVE AREA BY VELOCITY RATIO: 2.13 CM²
AV VALVE AREA: 2.67 CM²
AV VELOCITY RATIO: 0.7
BSA FOR ECHO PROCEDURE: 2.2 M2
CHOLEST SERPL-MCNC: 174 MG/DL (ref 0–200)
CHOLEST/HDLC SERPL: 3.8 {RATIO}
CV ECHO LV RWT: 0.5 CM
DOP CALC AO PEAK VEL: 1.33 M/S
DOP CALC AO VTI: 21.9 CM
DOP CALC LVOT AREA: 3 CM2
DOP CALC LVOT DIAMETER: 1.97 CM
DOP CALC LVOT PEAK VEL: 0.93 M/S
DOP CALC LVOT STROKE VOLUME: 58.49 CM3
DOP CALC MV VTI: 37 CM
DOP CALCLVOT PEAK VEL VTI: 19.2 CM
E WAVE DECELERATION TIME: 201.23 MSEC
E/A RATIO: 1.14
E/E' RATIO: 8.71 M/S
ECHO LV POSTERIOR WALL: 1.24 CM (ref 0.6–1.1)
FRACTIONAL SHORTENING: 36 % (ref 28–44)
HDLC SERPL-MCNC: 46 MG/DL (ref 40–60)
INTERVENTRICULAR SEPTUM: 1.29 CM (ref 0.6–1.1)
IVC DIAMETER: 1.46 CM
LDLC SERPL CALC-MCNC: 90 MG/DL
LDLC/HDLC SERPL: 2 {RATIO}
LEFT ATRIUM SIZE: 3.81 CM
LEFT INTERNAL DIMENSION IN SYSTOLE: 3.18 CM (ref 2.1–4)
LEFT VENTRICLE DIASTOLIC VOLUME INDEX: 56.52 ML/M2
LEFT VENTRICLE DIASTOLIC VOLUME: 117.56 ML
LEFT VENTRICLE MASS INDEX: 121 G/M2
LEFT VENTRICLE SYSTOLIC VOLUME INDEX: 19.4 ML/M2
LEFT VENTRICLE SYSTOLIC VOLUME: 40.36 ML
LEFT VENTRICULAR INTERNAL DIMENSION IN DIASTOLE: 4.99 CM (ref 3.5–6)
LEFT VENTRICULAR MASS: 251.04 G
LV LATERAL E/E' RATIO: 8.22 M/S
LV SEPTAL E/E' RATIO: 9.25 M/S
LVOT MG: 1.86 MMHG
LVOT MV: 0.65 CM/S
MV MEAN GRADIENT: 3 MMHG
MV PEAK A VEL: 0.65 M/S
MV PEAK E VEL: 0.74 M/S
MV PEAK GRADIENT: 7 MMHG
MV STENOSIS PRESSURE HALF TIME: 60.16 MS
MV VALVE AREA BY CONTINUITY EQUATION: 1.58 CM2
MV VALVE AREA P 1/2 METHOD: 3.66 CM2
NONHDLC SERPL-MCNC: 128 MG/DL
PISA AR MAX VEL: 4.31 M/S
PISA MRMAX VEL: 6.27 M/S
PISA TR MAX VEL: 2.54 M/S
PV PEAK GRADIENT: 6 MMHG
PV PEAK VELOCITY: 1.19 M/S
RA MAJOR: 4.05 CM
RIGHT VENTRICULAR END-DIASTOLIC DIMENSION: 3.61 CM
TDI LATERAL: 0.09 M/S
TDI SEPTAL: 0.08 M/S
TDI: 0.09 M/S
TR MAX PG: 26 MMHG
TRICUSPID ANNULAR PLANE SYSTOLIC EXCURSION: 1.84 CM
TRIGL SERPL-MCNC: 189 MG/DL (ref 35–150)
TSH SERPL DL<=0.005 MIU/L-ACNC: 3.95 UIU/ML (ref 0.36–3.74)
UA COMPLETE W REFLEX CULTURE PNL UR: NORMAL
VLDLC SERPL-MCNC: 38 MG/DL
Z-SCORE OF LEFT VENTRICULAR DIMENSION IN END DIASTOLE: -2.44
Z-SCORE OF LEFT VENTRICULAR DIMENSION IN END SYSTOLE: -1.6

## 2023-08-02 PROCEDURE — 94761 N-INVAS EAR/PLS OXIMETRY MLT: CPT

## 2023-08-02 PROCEDURE — 99232 PR SUBSEQUENT HOSPITAL CARE,LEVL II: ICD-10-PCS | Mod: ,,, | Performed by: FAMILY MEDICINE

## 2023-08-02 PROCEDURE — 80061 LIPID PANEL: CPT | Performed by: REGISTERED NURSE

## 2023-08-02 PROCEDURE — 25000003 PHARM REV CODE 250: Performed by: FAMILY MEDICINE

## 2023-08-02 PROCEDURE — 11000001 HC ACUTE MED/SURG PRIVATE ROOM

## 2023-08-02 PROCEDURE — 99232 SBSQ HOSP IP/OBS MODERATE 35: CPT | Mod: ,,, | Performed by: FAMILY MEDICINE

## 2023-08-02 PROCEDURE — 63600175 PHARM REV CODE 636 W HCPCS: Performed by: FAMILY MEDICINE

## 2023-08-02 PROCEDURE — 99900035 HC TECH TIME PER 15 MIN (STAT)

## 2023-08-02 PROCEDURE — 84443 ASSAY THYROID STIM HORMONE: CPT | Performed by: REGISTERED NURSE

## 2023-08-02 RX ORDER — REGADENOSON 0.08 MG/ML
0.4 INJECTION, SOLUTION INTRAVENOUS ONCE
Status: COMPLETED | OUTPATIENT
Start: 2023-08-02 | End: 2023-08-02

## 2023-08-02 RX ORDER — AMLODIPINE BESYLATE 10 MG/1
10 TABLET ORAL DAILY
Status: DISCONTINUED | OUTPATIENT
Start: 2023-08-02 | End: 2023-08-03 | Stop reason: HOSPADM

## 2023-08-02 RX ADMIN — CLORAZEPATE DIPOTASSIUM 7.5 MG: 3.75 TABLET ORAL at 09:08

## 2023-08-02 RX ADMIN — ENOXAPARIN SODIUM 40 MG: 100 INJECTION SUBCUTANEOUS at 04:08

## 2023-08-02 RX ADMIN — GUAIFENESIN AND DEXTROMETHORPHAN HYDROBROMIDE 1 TABLET: 30; 600 TABLET, EXTENDED RELEASE ORAL at 08:08

## 2023-08-02 RX ADMIN — GUAIFENESIN AND DEXTROMETHORPHAN HYDROBROMIDE 1 TABLET: 30; 600 TABLET, EXTENDED RELEASE ORAL at 09:08

## 2023-08-02 RX ADMIN — FUROSEMIDE 40 MG: 10 INJECTION, SOLUTION INTRAMUSCULAR; INTRAVENOUS at 09:08

## 2023-08-02 RX ADMIN — CLORAZEPATE DIPOTASSIUM 7.5 MG: 3.75 TABLET ORAL at 02:08

## 2023-08-02 RX ADMIN — CLORAZEPATE DIPOTASSIUM 7.5 MG: 3.75 TABLET ORAL at 08:08

## 2023-08-02 RX ADMIN — CARVEDILOL 3.12 MG: 3.12 TABLET, FILM COATED ORAL at 09:08

## 2023-08-02 RX ADMIN — DEXTROSE MONOHYDRATE 1 G: 5 INJECTION INTRAVENOUS at 09:08

## 2023-08-02 RX ADMIN — AMLODIPINE BESYLATE 10 MG: 10 TABLET ORAL at 02:08

## 2023-08-02 RX ADMIN — REGADENOSON 0.4 MG: 0.08 INJECTION, SOLUTION INTRAVENOUS at 11:08

## 2023-08-02 NOTE — NURSING
Nurses Note -- 4 Eyes      8/1/2023   11:02 PM      Skin assessed during: Q Shift Change      [x] No Altered Skin Integrity Present    []Prevention Measures Documented      [] Yes- Altered Skin Integrity Present or Discovered   [] LDA Added if Not in Epic (Describe Wound)   [] New Altered Skin Integrity was Present on Admit and Documented in LDA   [] Wound Image Taken    Wound Care Consulted? No    Attending Nurse:  Argentina Harris RN/Staff Member:   MARJORIE Caraballo

## 2023-08-02 NOTE — SUBJECTIVE & OBJECTIVE
Interval History:    No significant events overnight. Patient with no complaints today. Echocardiogram with some diastolic dysfunction and valvular regurgitation, NM stress test pending.    Review of Systems   Constitutional:  Positive for fatigue. Negative for diaphoresis.   Respiratory:  Negative for chest tightness, shortness of breath and wheezing.    Cardiovascular:  Positive for leg swelling. Negative for chest pain and palpitations.        Orthopnea   Gastrointestinal:  Negative for nausea and vomiting.   Neurological:  Negative for dizziness, syncope and headaches.   All other systems reviewed and are negative.    Objective:     Vital Signs (Most Recent):  Temp: 97.8 °F (36.6 °C) (08/02/23 0600)  Pulse: (!) 57 (08/02/23 0903)  Resp: 18 (08/02/23 0600)  BP: (!) 155/106 (08/02/23 0903)  SpO2: 100 % (08/02/23 0600) Vital Signs (24h Range):  Temp:  [97.8 °F (36.6 °C)-98.4 °F (36.9 °C)] 97.8 °F (36.6 °C)  Pulse:  [57-87] 57  Resp:  [18] 18  SpO2:  [99 %-100 %] 100 %  BP: (128-170)/() 155/106        There is no height or weight on file to calculate BMI.     Physical Exam  Constitutional:       General: She is not in acute distress.     Appearance: Normal appearance. She is not ill-appearing.   HENT:      Head: Normocephalic and atraumatic.      Nose: Nose normal.   Eyes:      Conjunctiva/sclera: Conjunctivae normal.      Pupils: Pupils are equal, round, and reactive to light.   Cardiovascular:      Rate and Rhythm: Normal rate and regular rhythm.   Pulmonary:      Effort: Pulmonary effort is normal. No respiratory distress.   Musculoskeletal:      Cervical back: No rigidity.      Right lower leg: No edema.      Left lower leg: No edema.   Skin:     Coloration: Skin is not jaundiced or pale.      Findings: No rash.   Neurological:      General: No focal deficit present.      Mental Status: She is alert and oriented to person, place, and time.   Psychiatric:         Mood and Affect: Mood and affect normal.          Behavior: Behavior normal.         Thought Content: Thought content normal.              CRANIAL NERVES     CN III, IV, VI   Pupils are equal, round, and reactive to light.       Significant Labs: All pertinent labs within the past 24 hours have been reviewed.    Significant Imaging: I have reviewed all pertinent imaging results/findings within the past 24 hours.

## 2023-08-02 NOTE — PROGRESS NOTES
[facility]  [facility]  Progress Note    Patient Name: Kerri Morrow  MRN: [id number]  Patient Class: IP- Inpatient   Admission Date: 7/31/2023  Length of Stay: 2 days  Attending Physician: Jenifer De La Torre DO  Primary Care Provider: Primary Doctor No        Subjective:     Principal Problem:Acute congestive heart failure        HPI:  Kerri Morrow is a 57-year-old female with history of HTN and EtOH use disorder, received as a transfer from [facility]. Patient initially presented with shortness of breath which she states began suddenly this morning. States she has also noted a decreased tolerance to activity and is unable to lie flat to sleep due to shortness of breath. These symptoms have been going on for some time but patient unable to provide specific onset. She does have bilateral lower extremity edema which is chronic. She denies any chest pain, nausea, or diaphoresis.     Initial evaluation with the following: troponin WNL, pBNP 454 (baseline unknown), D-dimer 1.13, macrocytosis with Hgb/Hct WN, UA with WBC 11-15. CXR with mild interstitial edema. Significant HTN with vital signs otherwise unremarkable.     Patient to be admitted to hospital service for further evaluation and management.       Overview/Hospital Course:  No notes on file    Interval History:    No significant events overnight. Patient with no complaints today. Echocardiogram with some diastolic dysfunction and valvular regurgitation, NM stress test pending.    Review of Systems   Constitutional:  Positive for fatigue. Negative for diaphoresis.   Respiratory:  Negative for chest tightness, shortness of breath and wheezing.    Cardiovascular:  Positive for leg swelling. Negative for chest pain and palpitations.        Orthopnea   Gastrointestinal:  Negative for nausea and vomiting.   Neurological:  Negative for dizziness, syncope and headaches.   All other systems reviewed and are negative.    Objective:      Vital Signs (Most Recent):  Temp: 97.8 °F (36.6 °C) (08/02/23 0600)  Pulse: (!) 57 (08/02/23 0903)  Resp: 18 (08/02/23 0600)  BP: (!) 155/106 (08/02/23 0903)  SpO2: 100 % (08/02/23 0600) Vital Signs (24h Range):  Temp:  [97.8 °F (36.6 °C)-98.4 °F (36.9 °C)] 97.8 °F (36.6 °C)  Pulse:  [57-87] 57  Resp:  [18] 18  SpO2:  [99 %-100 %] 100 %  BP: (128-170)/() 155/106        There is no height or weight on file to calculate BMI.     Physical Exam  Constitutional:       General: She is not in acute distress.     Appearance: Normal appearance. She is not ill-appearing.   HENT:      Head: Normocephalic and atraumatic.      Nose: Nose normal.   Eyes:      Conjunctiva/sclera: Conjunctivae normal.      Pupils: Pupils are equal, round, and reactive to light.   Cardiovascular:      Rate and Rhythm: Normal rate and regular rhythm.   Pulmonary:      Effort: Pulmonary effort is normal. No respiratory distress.   Musculoskeletal:      Cervical back: No rigidity.      Right lower leg: No edema.      Left lower leg: No edema.   Skin:     Coloration: Skin is not jaundiced or pale.      Findings: No rash.   Neurological:      General: No focal deficit present.      Mental Status: She is alert and oriented to person, place, and time.   Psychiatric:         Mood and Affect: Mood and affect normal.         Behavior: Behavior normal.         Thought Content: Thought content normal.              CRANIAL NERVES     CN III, IV, VI   Pupils are equal, round, and reactive to light.       Significant Labs: All pertinent labs within the past 24 hours have been reviewed.    Significant Imaging: I have reviewed all pertinent imaging results/findings within the past 24 hours.      Assessment/Plan:      * Acute congestive heart failure  Echocardiogram completed with report pending. Cardiology consulted.  ---  Patient is identified as having Diastolic (HFpEF) heart failure that is Acute. CHF is currently controlled. Latest ECHO performed and  "demonstrates-    Echo    Result Date: 8/2/2023    Left Ventricle: The left ventricle is normal in size. Increased wall   thickness. There is concentric remodeling. Normal wall motion. There is   normal systolic function with a visually estimated ejection fraction of 60   - 65%. Grade I diastolic dysfunction.    Left Atrium: Normal left atrial size.    Right Ventricle: Normal right ventricular cavity size. Systolic   function is normal.    Aortic Valve: The aortic valve is a trileaflet valve. There is mild   aortic regurgitation with a centrally directed jet.    Mitral Valve: There is no stenosis. The mean pressure gradient across   the mitral valve is 3 mmHg at a heart rate of  bpm. There is mild   regurgitation.    Tricuspid Valve: There is mild transvalvular regurgitation with a   centrally directed jet.    Pulmonic Valve: There is no significant stenosis.        Continue Beta Blocker and Furosemide and monitor clinical status closely. Monitor on telemetry. Patient is off CHF pathway.  Monitor strict Is&Os and daily weights.  Place on fluid restriction of 1.5 L. Cardiology has not been consulted. Continue to stress to patient importance of self efficacy and  on diet for CHF. Last BNP reviewed- and noted below No results for input(s): "BNP", "BNPTRIAGEBLO" in the last 168 hours.     Shortness of breath with pBNP 454 and infiltrates vs edema on CXR. History of EtOH use and poorly controlled HTN.     - echocardiogram pending  - cardiology consult as indicated by echo results    Alcohol use disorder  Daily EtOH use, average 2-3 quarts of beer daily although reports she has been trying to cut back this amount.     - Tranxene scheduled  - Ativan PRN  - encourage cessation +/- rehab  -  for rehab if desired      Macrocytosis without anemia  Likely secondary to EtOH. B12 and folate pending.      Acute cystitis without hematuria  Urinalysis with WBC 11-15.     - Rocephin 1g IV  - urine culture " pending      SOB (shortness of breath)  Secondary to CHF vs PE vs other. Heart failure most likely, CXR with infiltrates vs edema and pBNP elevated. Further evaluation pending, continue O2 and albuterol prn.       Positive D dimer  Bilateral LE doppler with no evidence of DVT.  ---  Secondary to inflammation (UTI) vs DVT/PE vs other. Does have new shortness of breath, lower extremity edema however this is chronic. No hypoxemia or tachycardia, Wells criteria low probability.    - LE doppler pending   - DVT prophylaxis       VTE Risk Mitigation (From admission, onward)         Ordered     enoxaparin injection 40 mg  Every 24 hours         07/31/23 1651     Reason for no Mechanical VTE Prophylaxis  Once        Question:  Reasons:  Answer:  Physician Provided (leave comment)  Comment:  DVT r/o    07/31/23 1651     IP VTE HIGH RISK PATIENT  Once         07/31/23 1651                Discharge Planning   STEW:      Code Status: Full Code   Is the patient medically ready for discharge?:     Reason for patient still in hospital (select all that apply): Treatment  Discharge Plan A: Home with family, Home Health                  Jenifer De La Torre DO  Department of Hospital Medicine   [facility]

## 2023-08-02 NOTE — ASSESSMENT & PLAN NOTE
"Echocardiogram completed with report pending. Cardiology consulted.  ---  Patient is identified as having Diastolic (HFpEF) heart failure that is Acute. CHF is currently controlled. Latest ECHO performed and demonstrates-    Echo    Result Date: 8/2/2023    Left Ventricle: The left ventricle is normal in size. Increased wall   thickness. There is concentric remodeling. Normal wall motion. There is   normal systolic function with a visually estimated ejection fraction of 60   - 65%. Grade I diastolic dysfunction.    Left Atrium: Normal left atrial size.    Right Ventricle: Normal right ventricular cavity size. Systolic   function is normal.    Aortic Valve: The aortic valve is a trileaflet valve. There is mild   aortic regurgitation with a centrally directed jet.    Mitral Valve: There is no stenosis. The mean pressure gradient across   the mitral valve is 3 mmHg at a heart rate of  bpm. There is mild   regurgitation.    Tricuspid Valve: There is mild transvalvular regurgitation with a   centrally directed jet.    Pulmonic Valve: There is no significant stenosis.        Continue Beta Blocker and Furosemide and monitor clinical status closely. Monitor on telemetry. Patient is off CHF pathway.  Monitor strict Is&Os and daily weights.  Place on fluid restriction of 1.5 L. Cardiology has not been consulted. Continue to stress to patient importance of self efficacy and  on diet for CHF. Last BNP reviewed- and noted below No results for input(s): "BNP", "BNPTRIAGEBLO" in the last 168 hours.     Shortness of breath with pBNP 454 and infiltrates vs edema on CXR. History of EtOH use and poorly controlled HTN.     - echocardiogram pending  - cardiology consult as indicated by echo results  "

## 2023-08-03 VITALS
TEMPERATURE: 98 F | SYSTOLIC BLOOD PRESSURE: 143 MMHG | RESPIRATION RATE: 20 BRPM | DIASTOLIC BLOOD PRESSURE: 87 MMHG | HEART RATE: 81 BPM | OXYGEN SATURATION: 98 %

## 2023-08-03 DIAGNOSIS — I50.32 HYPERTENSIVE HEART DISEASE WITH CHRONIC DIASTOLIC CONGESTIVE HEART FAILURE: Primary | ICD-10-CM

## 2023-08-03 DIAGNOSIS — I11.0 HYPERTENSIVE HEART DISEASE WITH CHRONIC DIASTOLIC CONGESTIVE HEART FAILURE: Primary | ICD-10-CM

## 2023-08-03 PROBLEM — R06.02 SOB (SHORTNESS OF BREATH): Status: RESOLVED | Noted: 2023-07-31 | Resolved: 2023-08-03

## 2023-08-03 PROBLEM — I50.9 ACUTE CONGESTIVE HEART FAILURE: Status: RESOLVED | Noted: 2023-07-31 | Resolved: 2023-08-03

## 2023-08-03 PROBLEM — R79.89 POSITIVE D DIMER: Status: RESOLVED | Noted: 2023-07-31 | Resolved: 2023-08-03

## 2023-08-03 PROBLEM — N30.00 ACUTE CYSTITIS WITHOUT HEMATURIA: Status: RESOLVED | Noted: 2023-07-31 | Resolved: 2023-08-03

## 2023-08-03 LAB
ANION GAP SERPL CALCULATED.3IONS-SCNC: 8 MMOL/L (ref 7–16)
BASOPHILS # BLD AUTO: 0.03 K/UL (ref 0–0.2)
BASOPHILS NFR BLD AUTO: 0.6 % (ref 0–1)
BUN SERPL-MCNC: 13 MG/DL (ref 7–18)
BUN/CREAT SERPL: 14 (ref 6–20)
CALCIUM SERPL-MCNC: 8.8 MG/DL (ref 8.5–10.1)
CHLORIDE SERPL-SCNC: 105 MMOL/L (ref 98–107)
CO2 SERPL-SCNC: 28 MMOL/L (ref 21–32)
CREAT SERPL-MCNC: 0.95 MG/DL (ref 0.55–1.02)
CV STRESS BASE HR: 57 BPM
DIASTOLIC BLOOD PRESSURE: 106 MMHG
DIFFERENTIAL METHOD BLD: ABNORMAL
EGFR (NO RACE VARIABLE) (RUSH/TITUS): 69 ML/MIN/1.73M2
EOSINOPHIL # BLD AUTO: 0 K/UL (ref 0–0.5)
EOSINOPHIL NFR BLD AUTO: 0 % (ref 1–4)
ERYTHROCYTE [DISTWIDTH] IN BLOOD BY AUTOMATED COUNT: 14.1 % (ref 11.5–14.5)
GLUCOSE SERPL-MCNC: 99 MG/DL (ref 74–106)
HCT VFR BLD AUTO: 37.3 % (ref 38–47)
HGB BLD-MCNC: 12.5 G/DL (ref 12–16)
IMM GRANULOCYTES # BLD AUTO: 0.01 K/UL (ref 0–0.04)
IMM GRANULOCYTES NFR BLD: 0.2 % (ref 0–0.4)
LYMPHOCYTES # BLD AUTO: 2.59 K/UL (ref 1–4.8)
LYMPHOCYTES NFR BLD AUTO: 49 % (ref 27–41)
MCH RBC QN AUTO: 35.1 PG (ref 27–31)
MCHC RBC AUTO-ENTMCNC: 33.5 G/DL (ref 32–36)
MCV RBC AUTO: 104.8 FL (ref 80–96)
MONOCYTES # BLD AUTO: 0.52 K/UL (ref 0–0.8)
MONOCYTES NFR BLD AUTO: 9.8 % (ref 2–6)
MPC BLD CALC-MCNC: 10.9 FL (ref 9.4–12.4)
NEUTROPHILS # BLD AUTO: 2.14 K/UL (ref 1.8–7.7)
NEUTROPHILS NFR BLD AUTO: 40.4 % (ref 53–65)
NRBC # BLD AUTO: 0 X10E3/UL
NRBC, AUTO (.00): 0 %
OHS CV CPX 85 PERCENT MAX PREDICTED HEART RATE MALE: 130
OHS CV CPX MAX PREDICTED HEART RATE: 153
OHS CV CPX PATIENT IS FEMALE: 1
OHS CV CPX PATIENT IS MALE: 0
OHS CV CPX PEAK DIASTOLIC BLOOD PRESSURE: 106 MMHG
OHS CV CPX PEAK HEAR RATE: 90 BPM
OHS CV CPX PEAK RATE PRESSURE PRODUCT: NORMAL
OHS CV CPX PEAK SYSTOLIC BLOOD PRESSURE: 155 MMHG
OHS CV CPX PERCENT MAX PREDICTED HEART RATE ACHIEVED: 59
OHS CV CPX RATE PRESSURE PRODUCT PRESENTING: 8835
PLATELET # BLD AUTO: 252 K/UL (ref 150–400)
POTASSIUM SERPL-SCNC: 3.4 MMOL/L (ref 3.5–5.1)
RBC # BLD AUTO: 3.56 M/UL (ref 4.2–5.4)
SODIUM SERPL-SCNC: 138 MMOL/L (ref 136–145)
SYSTOLIC BLOOD PRESSURE: 155 MMHG
T4 FREE SERPL-MCNC: 1.14 NG/DL (ref 0.76–1.46)
WBC # BLD AUTO: 5.29 K/UL (ref 4.5–11)

## 2023-08-03 PROCEDURE — 99239 HOSP IP/OBS DSCHRG MGMT >30: CPT | Mod: ,,, | Performed by: FAMILY MEDICINE

## 2023-08-03 PROCEDURE — 25000003 PHARM REV CODE 250: Performed by: FAMILY MEDICINE

## 2023-08-03 PROCEDURE — 99233 SBSQ HOSP IP/OBS HIGH 50: CPT | Mod: ,,, | Performed by: REGISTERED NURSE

## 2023-08-03 PROCEDURE — 80048 BASIC METABOLIC PNL TOTAL CA: CPT | Performed by: FAMILY MEDICINE

## 2023-08-03 PROCEDURE — 85025 COMPLETE CBC W/AUTO DIFF WBC: CPT | Performed by: FAMILY MEDICINE

## 2023-08-03 PROCEDURE — 63600175 PHARM REV CODE 636 W HCPCS: Performed by: FAMILY MEDICINE

## 2023-08-03 PROCEDURE — 99239 PR HOSPITAL DISCHARGE DAY,>30 MIN: ICD-10-PCS | Mod: ,,, | Performed by: FAMILY MEDICINE

## 2023-08-03 PROCEDURE — 84439 ASSAY OF FREE THYROXINE: CPT | Performed by: FAMILY MEDICINE

## 2023-08-03 PROCEDURE — 99233 PR SUBSEQUENT HOSPITAL CARE,LEVL III: ICD-10-PCS | Mod: ,,, | Performed by: REGISTERED NURSE

## 2023-08-03 RX ORDER — LOSARTAN POTASSIUM 25 MG/1
25 TABLET ORAL DAILY
Qty: 30 TABLET | Refills: 0 | Status: SHIPPED | OUTPATIENT
Start: 2023-08-03 | End: 2023-12-18

## 2023-08-03 RX ORDER — FUROSEMIDE 40 MG/1
40 TABLET ORAL DAILY
Qty: 30 TABLET | Refills: 0 | Status: SHIPPED | OUTPATIENT
Start: 2023-08-03

## 2023-08-03 RX ORDER — AMLODIPINE BESYLATE 10 MG/1
10 TABLET ORAL DAILY
Qty: 30 TABLET | Refills: 0 | Status: SHIPPED | OUTPATIENT
Start: 2023-08-04 | End: 2023-12-18 | Stop reason: ALTCHOICE

## 2023-08-03 RX ADMIN — DEXTROSE MONOHYDRATE 1 G: 5 INJECTION INTRAVENOUS at 08:08

## 2023-08-03 RX ADMIN — CLORAZEPATE DIPOTASSIUM 7.5 MG: 3.75 TABLET ORAL at 08:08

## 2023-08-03 RX ADMIN — POLYETHYLENE GLYCOL 3350 17 G: 17 POWDER, FOR SOLUTION ORAL at 08:08

## 2023-08-03 RX ADMIN — FUROSEMIDE 40 MG: 10 INJECTION, SOLUTION INTRAMUSCULAR; INTRAVENOUS at 08:08

## 2023-08-03 RX ADMIN — GUAIFENESIN AND DEXTROMETHORPHAN HYDROBROMIDE 1 TABLET: 30; 600 TABLET, EXTENDED RELEASE ORAL at 08:08

## 2023-08-03 RX ADMIN — AMLODIPINE BESYLATE 10 MG: 10 TABLET ORAL at 08:08

## 2023-08-03 NOTE — SUBJECTIVE & OBJECTIVE
Interval History: No acute events overnight. Dyspnea improved. No reported chest pains     Review of Systems   Cardiovascular:  Negative for chest pain, dyspnea on exertion, irregular heartbeat, leg swelling, near-syncope, orthopnea, palpitations and paroxysmal nocturnal dyspnea.   Respiratory:  Negative for shortness of breath.    All other systems reviewed and are negative.    Objective:     Vital Signs (Most Recent):  Temp: 98 °F (36.7 °C) (08/03/23 1200)  Pulse: 81 (08/03/23 1200)  Resp: 20 (08/03/23 1200)  BP: (!) 143/87 (08/03/23 1200)  SpO2: 98 % (08/03/23 1200) Vital Signs (24h Range):  Temp:  [97.3 °F (36.3 °C)-98 °F (36.7 °C)] 98 °F (36.7 °C)  Pulse:  [63-83] 81  Resp:  [18-20] 20  SpO2:  [96 %-99 %] 98 %  BP: (132-156)/(59-87) 143/87        There is no height or weight on file to calculate BMI.     SpO2: 98 %         Intake/Output Summary (Last 24 hours) at 8/3/2023 1419  Last data filed at 8/3/2023 0701  Gross per 24 hour   Intake --   Output 150 ml   Net -150 ml       Lines/Drains/Airways       None                      Physical Exam  Vitals reviewed.   Constitutional:       General: She is not in acute distress.     Appearance: She is obese.   HENT:      Head: Normocephalic and atraumatic.      Mouth/Throat:      Mouth: Mucous membranes are moist.   Eyes:      Extraocular Movements: Extraocular movements intact.      Conjunctiva/sclera: Conjunctivae normal.   Cardiovascular:      Rate and Rhythm: Normal rate and regular rhythm.      Pulses: Normal pulses.      Heart sounds: Murmur heard.      Comments: AORTIC MURMUR  Pulmonary:      Effort: Pulmonary effort is normal.      Breath sounds: Normal breath sounds.   Abdominal:      General: Bowel sounds are normal. There is no distension.      Palpations: Abdomen is soft.      Tenderness: There is no abdominal tenderness.   Musculoskeletal:         General: No swelling. Normal range of motion.      Cervical back: Normal range of motion and neck supple.    Skin:     General: Skin is warm and dry.      Capillary Refill: Capillary refill takes less than 2 seconds.   Neurological:      General: No focal deficit present.      Mental Status: She is alert and oriented to person, place, and time.      Cranial Nerves: No cranial nerve deficit.      Sensory: No sensory deficit.   Psychiatric:         Mood and Affect: Mood normal.         Behavior: Behavior normal.            Significant Labs: All pertinent lab results from the last 24 hours have been reviewed.    Significant Imaging: Echocardiogram: Transthoracic echo (TTE) complete (Cupid Only):   Results for orders placed or performed during the hospital encounter of 07/31/23   Echo   Result Value Ref Range    BSA 2.2 m2    LVOT stroke volume 58.49 cm3    LVIDd 4.99 3.5 - 6.0 cm    LV Systolic Volume 40.36 mL    LV Systolic Volume Index 19.4 mL/m2    LVIDs 3.18 2.1 - 4.0 cm    LV Diastolic Volume 117.56 mL    LV Diastolic Volume Index 56.52 mL/m2    IVS 1.29 (A) 0.6 - 1.1 cm    LVOT diameter 1.97 cm    LVOT area 3.0 cm2    FS 36 28 - 44 %    Left Ventricle Relative Wall Thickness 0.50 cm    Posterior Wall 1.24 (A) 0.6 - 1.1 cm    TDI LATERAL 0.09 m/s    TDI SEPTAL 0.08 m/s    LV mass 251.04 g    LV Mass Index 121 g/m2    MV Peak E Aleksandar 0.74 m/s    LV LATERAL E/E' RATIO 8.22 m/s    LV SEPTAL E/E' RATIO 9.25 m/s    E/E' ratio 8.71 m/s    MV Peak A Aleksandar 0.65 m/s    TR Max Aleksandar 2.54 m/s    E/A ratio 1.14     Mean e' 0.09 m/s    E wave deceleration time 201.23 msec    LVOT peak aleksandar 0.93 m/s    Left Ventricular Outflow Tract Mean Velocity 0.65 cm/s    Left Ventricular Outflow Tract Mean Gradient 1.86 mmHg    LA size 3.81 cm    RVDD 3.61 cm    RA Major Axis 4.05 cm    AV regurgitation pressure 1/2 time [id number] ms    AR Max Aleksandar 4.31 m/s    AV mean gradient 4 mmHg    AV peak gradient 7 mmHg    Ao peak aleksandar 1.33 m/s    Ao VTI 21.90 cm    LVOT peak VTI 19.20 cm    AV valve area 2.67 cm²    AV Velocity Ratio 0.70     AV index  (prosthetic) 0.88     MARK by Velocity Ratio 2.13 cm²    Mr max gabe 6.27 m/s    MV mean gradient 3 mmHg    MV peak gradient 7 mmHg    MV stenosis pressure 1/2 time 60.16 ms    MV valve area p 1/2 method 3.66 cm2    MV valve area by continuity eq 1.58 cm2    MV VTI 37.0 cm    TAPSE 1.84 cm    Triscuspid Valve Regurgitation Peak Gradient 26 mmHg    PV PEAK VELOCITY 1.19 m/s    PV peak gradient 6 mmHg    Ao root annulus 2.52 cm    ZLVIDS -1.60     ZLVIDD -2.44     AORTIC VALVE CUSP SEPERATION 1.79 cm    IVC diameter 1.46 cm    Narrative      Left Ventricle: The left ventricle is normal in size. Increased wall   thickness. There is concentric remodeling. Normal wall motion. There is   normal systolic function with a visually estimated ejection fraction of 60   - 65%. Grade I diastolic dysfunction.    Left Atrium: Normal left atrial size.    Right Ventricle: Normal right ventricular cavity size. Systolic   function is normal.    Aortic Valve: The aortic valve is a trileaflet valve. There is mild   aortic regurgitation with a centrally directed jet.    Mitral Valve: There is no stenosis. The mean pressure gradient across   the mitral valve is 3 mmHg at a heart rate of  bpm. There is mild   regurgitation.    Tricuspid Valve: There is mild transvalvular regurgitation with a   centrally directed jet.    Pulmonic Valve: There is no significant stenosis.

## 2023-08-03 NOTE — PROGRESS NOTES
[facility]  Cardiology  Progress Note    Patient Name: Kerri Morrow  MRN: [id number]  Admission Date: 7/31/2023  Hospital Length of Stay: 3 days  Code Status: Full Code   Attending Physician: No att. providers found   Primary Care Physician: Tootie, Primary Doctor  Expected Discharge Date: 8/3/2023  Principal Problem:Hypertensive heart disease with chronic diastolic congestive heart failure    Subjective:     Hospital Course:   No notes on file    Interval History: No acute events overnight. Dyspnea improved. No reported chest pains     Review of Systems   Cardiovascular:  Negative for chest pain, dyspnea on exertion, irregular heartbeat, leg swelling, near-syncope, orthopnea, palpitations and paroxysmal nocturnal dyspnea.   Respiratory:  Negative for shortness of breath.    All other systems reviewed and are negative.    Objective:     Vital Signs (Most Recent):  Temp: 98 °F (36.7 °C) (08/03/23 1200)  Pulse: 81 (08/03/23 1200)  Resp: 20 (08/03/23 1200)  BP: (!) 143/87 (08/03/23 1200)  SpO2: 98 % (08/03/23 1200) Vital Signs (24h Range):  Temp:  [97.3 °F (36.3 °C)-98 °F (36.7 °C)] 98 °F (36.7 °C)  Pulse:  [63-83] 81  Resp:  [18-20] 20  SpO2:  [96 %-99 %] 98 %  BP: (132-156)/(59-87) 143/87        There is no height or weight on file to calculate BMI.     SpO2: 98 %         Intake/Output Summary (Last 24 hours) at 8/3/2023 1419  Last data filed at 8/3/2023 0701  Gross per 24 hour   Intake --   Output 150 ml   Net -150 ml       Lines/Drains/Airways       None                      Physical Exam  Vitals reviewed.   Constitutional:       General: She is not in acute distress.     Appearance: She is obese.   HENT:      Head: Normocephalic and atraumatic.      Mouth/Throat:      Mouth: Mucous membranes are moist.   Eyes:      Extraocular Movements: Extraocular movements intact.      Conjunctiva/sclera: Conjunctivae normal.   Cardiovascular:      Rate and Rhythm: Normal rate and regular rhythm.       Pulses: Normal pulses.      Heart sounds: Murmur heard.      Comments: AORTIC MURMUR  Pulmonary:      Effort: Pulmonary effort is normal.      Breath sounds: Normal breath sounds.   Abdominal:      General: Bowel sounds are normal. There is no distension.      Palpations: Abdomen is soft.      Tenderness: There is no abdominal tenderness.   Musculoskeletal:         General: No swelling. Normal range of motion.      Cervical back: Normal range of motion and neck supple.   Skin:     General: Skin is warm and dry.      Capillary Refill: Capillary refill takes less than 2 seconds.   Neurological:      General: No focal deficit present.      Mental Status: She is alert and oriented to person, place, and time.      Cranial Nerves: No cranial nerve deficit.      Sensory: No sensory deficit.   Psychiatric:         Mood and Affect: Mood normal.         Behavior: Behavior normal.            Significant Labs: All pertinent lab results from the last 24 hours have been reviewed.    Significant Imaging: Echocardiogram: Transthoracic echo (TTE) complete (Cupid Only):   Results for orders placed or performed during the hospital encounter of 07/31/23   Echo   Result Value Ref Range    BSA 2.2 m2    LVOT stroke volume 58.49 cm3    LVIDd 4.99 3.5 - 6.0 cm    LV Systolic Volume 40.36 mL    LV Systolic Volume Index 19.4 mL/m2    LVIDs 3.18 2.1 - 4.0 cm    LV Diastolic Volume 117.56 mL    LV Diastolic Volume Index 56.52 mL/m2    IVS 1.29 (A) 0.6 - 1.1 cm    LVOT diameter 1.97 cm    LVOT area 3.0 cm2    FS 36 28 - 44 %    Left Ventricle Relative Wall Thickness 0.50 cm    Posterior Wall 1.24 (A) 0.6 - 1.1 cm    TDI LATERAL 0.09 m/s    TDI SEPTAL 0.08 m/s    LV mass 251.04 g    LV Mass Index 121 g/m2    MV Peak E Aleksandar 0.74 m/s    LV LATERAL E/E' RATIO 8.22 m/s    LV SEPTAL E/E' RATIO 9.25 m/s    E/E' ratio 8.71 m/s    MV Peak A Aleksandar 0.65 m/s    TR Max Aleksandar 2.54 m/s    E/A ratio 1.14     Mean e' 0.09 m/s    E wave deceleration time 201.23 msec     LVOT peak aleksandar 0.93 m/s    Left Ventricular Outflow Tract Mean Velocity 0.65 cm/s    Left Ventricular Outflow Tract Mean Gradient 1.86 mmHg    LA size 3.81 cm    RVDD 3.61 cm    RA Major Axis 4.05 cm    AV regurgitation pressure 1/2 time [id number] ms    AR Max Aleksandar 4.31 m/s    AV mean gradient 4 mmHg    AV peak gradient 7 mmHg    Ao peak aleksandar 1.33 m/s    Ao VTI 21.90 cm    LVOT peak VTI 19.20 cm    AV valve area 2.67 cm²    AV Velocity Ratio 0.70     AV index (prosthetic) 0.88     MARK by Velocity Ratio 2.13 cm²    Mr max aleksandar 6.27 m/s    MV mean gradient 3 mmHg    MV peak gradient 7 mmHg    MV stenosis pressure 1/2 time 60.16 ms    MV valve area p 1/2 method 3.66 cm2    MV valve area by continuity eq 1.58 cm2    MV VTI 37.0 cm    TAPSE 1.84 cm    Triscuspid Valve Regurgitation Peak Gradient 26 mmHg    PV PEAK VELOCITY 1.19 m/s    PV peak gradient 6 mmHg    Ao root annulus 2.52 cm    ZLVIDS -1.60     ZLVIDD -2.44     AORTIC VALVE CUSP SEPERATION 1.79 cm    IVC diameter 1.46 cm    Narrative      Left Ventricle: The left ventricle is normal in size. Increased wall   thickness. There is concentric remodeling. Normal wall motion. There is   normal systolic function with a visually estimated ejection fraction of 60   - 65%. Grade I diastolic dysfunction.    Left Atrium: Normal left atrial size.    Right Ventricle: Normal right ventricular cavity size. Systolic   function is normal.    Aortic Valve: The aortic valve is a trileaflet valve. There is mild   aortic regurgitation with a centrally directed jet.    Mitral Valve: There is no stenosis. The mean pressure gradient across   the mitral valve is 3 mmHg at a heart rate of  bpm. There is mild   regurgitation.    Tricuspid Valve: There is mild transvalvular regurgitation with a   centrally directed jet.    Pulmonic Valve: There is no significant stenosis.       Assessment and Plan:     Brief HPI:56 y/o female with a past medical hx of CVA, HTN, Alcohol dependance  who is seen in consult today for signs and symptoms of CHF. Patient admitted to [facility] in transfer from [facility] after presenting to the ED with dyspnea. Dyspnea ongoing for several weeks with worsening yesterday morning and was exacerbated with exertion. Patient also endorses orthopnea and PND recently and is minimally physically active due to CVA with residual to left arm and leg. No reported nausea, vomiting, headache, heartburn, chest pain, diaphoresis, decreased appetite, palpitations, dizziness, fever, syncope or recent illness. Denies illegal drug use however, uses smokeless tobacco and is a daily drinker of 1-2 quarts of beer a day. Family at bedside reports patient does snore at night and patient has had poorly controlled HTN.     Workup: Ekg: SR with non specific ST abnormalities and LVH, pBnp 454, D-dimer 1.13, HTN urgency with presentation to ED     * Hypertensive heart disease with chronic diastolic congestive heart failure  8/1/23  - Cardiac monitoring - Echo pending - Ekg: SR with non specific ST abnormalities and LVH  - Cxr: Mild interstitial edema - Troponin negative  - NM stress test in a.m.   - Lipid panel/Tsh  pending - pBnp 454  - Daily BMP - IV Lasix - GDMT pending echo results  - Low Sodium diet - Strict Intake and Output q shift - Daily Weights   - CHF Education- Dietician consult - - Cardiac rehab ( consult) - GDMT as indicated pending echo results    - Educated on medication adherence, blood pressure control, stress reduction, cholesterol, tobacco cessation, weight management, diet, diabetes, physical activity, daily weight monitoring at home to detect fluid accumulation before it becomes symptomatic, and other risk factor modifications     8/3/23  - Yumiko scan unremarkable for ischemia  - EF 60 - 65% - Grade one diastolic dysfunction  - Losartan 25 mg started  - Follow up with [facility] as scheduled. Consider Aldactone pending BMP before appointment.  -  Follow up Dr. Thompson 4-6 weeks        VTE Risk Mitigation (From admission, onward)           Ordered     enoxaparin injection 40 mg  Every 24 hours         07/31/23 1651     Reason for no Mechanical VTE Prophylaxis  Once        Question:  Reasons:  Answer:  Physician Provided (leave comment)  Comment:  DVT r/o    07/31/23 1651     IP VTE HIGH RISK PATIENT  Once         07/31/23 1651                    MARTIN Mckeon  Cardiology  [facility]

## 2023-08-03 NOTE — ASSESSMENT & PLAN NOTE
8/1/23  - Cardiac monitoring - Echo pending - Ekg: SR with non specific ST abnormalities and LVH  - Cxr: Mild interstitial edema - Troponin negative  - NM stress test in a.m.   - Lipid panel/Tsh  pending - pBnp 454  - Daily BMP - IV Lasix - GDMT pending echo results  - Low Sodium diet - Strict Intake and Output q shift - Daily Weights   - CHF Education- Dietician consult - - Cardiac rehab ( consult) - GDMT as indicated pending echo results    - Educated on medication adherence, blood pressure control, stress reduction, cholesterol, tobacco cessation, weight management, diet, diabetes, physical activity, daily weight monitoring at home to detect fluid accumulation before it becomes symptomatic, and other risk factor modifications     8/3/23  - Yumiko scan unremarkable for ischemia  - EF 60 - 65% - Grade one diastolic dysfunction  - Losartan 25 mg started  - Follow up with [facility] as scheduled. Consider Aldactone pending BMP before appointment.

## 2023-08-03 NOTE — DISCHARGE INSTRUCTIONS
Hospitalist Discharge orders  *Notify your healthcare provider if you experience any of the following: temperature >100.4  * Notify your healthcare provider if you experience any of the following: redness, tenderness.    *Notify your healthcare provider if you experience any of the following: difficulty breathing or     increased cough.  *Notify your physician if you experience any persistent nausea, vomiting, or diarrhea or headache  *Notify your physician if you experience any of the following:severe uncontrolled pain;worsening rash, increased confusion or weakness; dizziness, lightheadedness or visual disturbances       *** Before your follow up appointment please go to [facility] and have blood work drawn. 1-2 days before scheduled appointment. ***

## 2023-08-03 NOTE — PLAN OF CARE
[facility]  Discharge Final Note    Primary Care Provider: Tootie, Primary Doctor    Expected Discharge Date: 8/3/2023    Final Discharge Note (most recent)       Final Note - 08/03/23 1211          Final Note    Assessment Type Final Discharge Note     Anticipated Discharge Disposition Home-Health Care Svc        Post-Acute Status    Post-Acute Authorization Home Health     Home Health Status Set-up Complete/Auth obtained     Patient choice form signed by patient/caregiver List with quality metrics by geographic area provided;List from CMS Compare;List from System Post-Acute Care     Discharge Delays None known at this time                 Pt d/c home. [facility]ed [facility]    Important Message from Medicare  Important Message from Medicare regarding Discharge Appeal Rights: Given to patient/caregiver, Explained to patient/caregiver, Signed/date by patient/caregiver     Date IMM was signed: 08/01/23  Time IMM was signed: 1005     [place] [address]    Ph. [phone]       Next Steps: Schedule an appointment as soon as possible for a visit in 1 week(s)              After-discharge care                Home Medical Care       [facility]   Service: Home Health Services    [address] [address]   Phone: [phone]

## 2023-08-04 ENCOUNTER — TELEPHONE (OUTPATIENT)
Dept: ORTHOPEDICS | Facility: HOSPITAL | Age: 58
End: 2023-08-04
Payer: MEDICARE

## 2023-08-04 ENCOUNTER — PATIENT OUTREACH (OUTPATIENT)
Dept: ADMINISTRATIVE | Facility: CLINIC | Age: 58
End: 2023-08-04

## 2023-08-04 NOTE — PHYSICIAN QUERY
PT Name: Kerri Morrow  MR #: [id number]     DOCUMENTATION CLARIFICATION     CDS/: Mirela Watson RN CDIS          Contact information: [email or web address]    This form is a permanent document in the medical record.     Query Date: August 4, 2023    By submitting this query, we are merely seeking further clarification of documentation.  Please utilize your independent clinical judgment when addressing the question(s) below.    The Medical Record contains the following   Indicators Supporting Clinical Findings Location in Medical Record   x Heart Failure documented Hypertensive heart disease with chronic diastolic congestive heart failure    Diastolic (HFpEF) heart failure that is Acute Cards note 8/3       HM note 8/2   x BNP  Latest Reference Range & Units 07/31/23 11:47   NT-proBNP 1 - 125 pg/mL 454 (H)    Labs    x EF/Echo   Left Ventricle: The left ventricle is normal in size. Increased wall thickness. There is concentric remodeling. Normal wall motion. There is normal systolic function with a visually estimated ejection fraction of 60 - 65%. Grade I diastolic dysfunction.    Left Atrium: Normal left atrial size.    Right Ventricle: Normal right ventricular cavity size. Systolic function is normal.    Aortic Valve: The aortic valve is a trileaflet valve. There is mild aortic regurgitation with a centrally directed jet.    Mitral Valve: There is no stenosis. The mean pressure gradient across the mitral valve is 3 mmHg at a heart rate of  bpm. There is mild regurgitation.    Tricuspid Valve: There is mild transvalvular regurgitation with a centrally directed jet.    Pulmonic Valve: There is no significant stenosis. ECHO 8/1    x Radiology findings Cxr: Mild interstitial edema  Cards note 8/3    x Subjective/Objective Respiratory Conditions Orthopnea  H&P     Recent/Current MI      Heart Transplant, LVAD      Edema, JVD      Ascites     x Diuretics/Meds furosemide injection 40 mg  Dose: 40 mg  Freq:  Daily Route: IV  Start: 08/01/23 0900 End: 08/03/23 1703 MAR    x Other Treatment Low Sodium diet - Strict Intake and Output q shift - Daily Weights   - CHF Education- Dietician consult - - Cardiac rehab ( consult) - GDMT as indicated pending echo results    - Educated on medication adherence, blood pressure control, stress reduction, cholesterol, tobacco cessation, weight management, diet, diabetes, physical activity, daily weight monitoring at home to detect fluid accumulation before it becomes symptomatic, and other risk factor modifications  Cards note 8/3     Other       Heart failure is a clinical diagnosis which includes symptomatic fluid retention, elevated intracardiac pressures, and/or the inability of the heart to deliver adequate blood flow.    Heart Failure with reduced Ejection Fraction (HFrEF) or Systolic Heart Failure (loses ability to contract normally, EF is <40%)    Heart Failure with preserved Ejection Fraction (HFpEF) or Diastolic Heart Failure (stiff ventricles, does not relax properly, EF is >50%)     Heart Failure with Combined Systolic and Diastolic Failure (stiff ventricles, does not relax properly and EF is <50%)    Mid-range or mildly reduced ejection fraction (HFmrEF) is classified as systolic heart failure.  Congestive heart failure with a recovered EF is classified as Diastolic Heart Failure.  Common clues to acute exacerbation:  Rapidly progressive symptoms (w/in 2 weeks of presentation), using IV diuretics, using supplemental O2, pulmonary edema on Xray, new or worsening pleural effusion, +JVD or other signs of volume overload, MI w/in 4 weeks, and/or BNP >500  The clinical guidelines noted are only system guidelines, and do not replace the providers clinical judgment.    Provider, please clarify the ACUITY of the heart failure    [  x ]  Acute Diastolic Heart Failure (HFpEF) - new diagnosis   [   ]  Acute on Chronic Diastolic Heart Failure (HFpEF) - worsening of CHF  signs/symptoms in preexisting CHF   [   ]  Chronic Diastolic Heart Failure (HFpEF) - preexisting and stable   [   ]  Other (please specify): ___________________________________         Please document in your progress notes daily for the duration of treatment until resolved and include in your discharge summary.    References:  American Heart Association editorial staff. (2017, May). Ejection Fraction Heart Failure Measurement. American Heart Association. https://www.heart.org/en/health-topics/heart-failure/diagnosing-heart-failure/ejection-fraction-heart-failure-measurement#:~:text=Ejection%20fraction%20(EF)%20is%20a,pushed%20out%20with%20each%20heartbeat  EUNICE Jerry (2020, December 15). Heart failure with preserved ejection fraction: Clinical manifestations and diagnosis. [employer]. https://www.[employer].com/contents/heart-failure-with-preserved-ejection-fraction-clinical-manifestations-and-diagnosis.  ICD-10-CM/PCS Coding Clinic Third Quarter ICD-10, Effective with discharges: September 8, 2020 [facility] § Heart failure with mid-range or mildly reduced ejection fraction (2020).  ICD-10-CM/PCS Coding Clinic Third Quarter ICD-10, Effective with discharges: September 8, 2020 [facility] [employer] § Heart failure with recovered ejection fraction (2020).  Form No. [id number]

## 2023-08-04 NOTE — PROGRESS NOTES
C3 nurse spoke with Kerri Morrow  for a TCC post hospital discharge follow up call. The patient reports does not have a scheduled HOSFU appointment within 5-7 days.   C3 nurse was unable to schedule HOSFU appointment for [facility] PCP. Patient advised to contact their PCP to schedule a HOSPFU within 5-7 days. Patient does have appt scheduled on 08/18/2023 at 1045.

## 2023-08-06 LAB — BACTERIA BLD CULT: NORMAL

## 2023-08-07 ENCOUNTER — TELEPHONE (OUTPATIENT)
Dept: ORTHOPEDICS | Facility: HOSPITAL | Age: 58
End: 2023-08-07
Payer: MEDICARE

## 2023-08-07 NOTE — TELEPHONE ENCOUNTER
Patients states doing well, no complaints or concerns voiced Patients states doing well, no complaints or concerns voiced

## 2023-08-08 ENCOUNTER — HOSPITAL ENCOUNTER (EMERGENCY)
Facility: HOSPITAL | Age: 58
Discharge: HOME | End: 2023-08-08
Payer: MEDICARE

## 2023-08-08 VITALS
BODY MASS INDEX: 43.24 KG/M2 | HEART RATE: 81 BPM | RESPIRATION RATE: 18 BRPM | WEIGHT: 235 LBS | DIASTOLIC BLOOD PRESSURE: 75 MMHG | SYSTOLIC BLOOD PRESSURE: 132 MMHG | HEIGHT: 62 IN | TEMPERATURE: 99 F | OXYGEN SATURATION: 99 %

## 2023-08-08 DIAGNOSIS — R12 HEARTBURN: Primary | ICD-10-CM

## 2023-08-08 PROCEDURE — 99283 EMERGENCY DEPT VISIT LOW MDM: CPT

## 2023-08-08 PROCEDURE — 99284 EMERGENCY DEPT VISIT MOD MDM: CPT | Mod: ,,, | Performed by: NURSE PRACTITIONER

## 2023-08-08 PROCEDURE — 99284 PR EMERGENCY DEPT VISIT,LEVEL IV: ICD-10-PCS | Mod: ,,, | Performed by: NURSE PRACTITIONER

## 2023-08-08 RX ORDER — PANTOPRAZOLE SODIUM 40 MG/1
40 TABLET, DELAYED RELEASE ORAL DAILY
Qty: 30 TABLET | Refills: 2 | Status: SHIPPED | OUTPATIENT
Start: 2023-08-08

## 2023-08-08 NOTE — ED TRIAGE NOTES
Pt presents to ED with c/o heartburn earlier. Pt denies burning in chest at this time. Pt states she took 4 ASA and some tums, which relieved her pain. Pt states she ate a chicken sandwich earlier today with pickles and onions on it. Has no complaints at this time.

## 2023-08-09 PROBLEM — I50.9 ACUTE CONGESTIVE HEART FAILURE: Status: ACTIVE | Noted: 2023-08-09

## 2023-08-09 NOTE — HOSPITAL COURSE
Patient admitted with shortness of breath, hypertension with elevated pBNP and elevate d-dimer. Hospital course uncomplicated. Presenting symptoms resolved with diuresis and blood pressure management. Bilateral LE doppler with no evidence of DVT. Echocardiogram with diastolic dysfunction, EF 60-65%, mild valvular regurgitation. Nuclear stress test performed with no symptoms, EKG changes, or ischemia noted on nuclear imaging. Recommendations for medical management and outpatient follow up. At time of discharge, patient was at baseline function with no complaints. Vital signs grossly WNL, labs unremarkable/patient baseline.     At this time,Kerri Morrow has reached maximum benefit of hospitalization and will be discharged home with family. Follow up with PCP, follow up with cardiology. Discharge instructions and education provided, questions addressed.

## 2023-08-09 NOTE — DISCHARGE INSTRUCTIONS
Follow up with primary care provider in 2-3 days for re-evaluation.   Refill on your Protonix (heartburn medication) has been sent into the pharmacy. Begin taking this when you pick it up from the pharmacy.   Continue all other medications as directed.   Return to emergency department for any new or worsening symptoms, chest pain, shortness of breath, breaking out in sweat, nausea, vomiting or any other worrisome or concerning symptoms.

## 2023-08-09 NOTE — ASSESSMENT & PLAN NOTE
Discharge medications: ARB, CCB, furosemide.     Stable and controlled at time of discharge. Follow up with PCP, cardiology. Encourage blood pressure control and alcohol/tobacco cessation.     Patient is identified as having Diastolic (HFpEF) heart failure that is Acute. CHF is currently controlled. Latest ECHO performed and demonstrates-    Echo    Result Date: 8/2/2023    Left Ventricle: The left ventricle is normal in size. Increased wall   thickness. There is concentric remodeling. Normal wall motion. There is   normal systolic function with a visually estimated ejection fraction of 60   - 65%. Grade I diastolic dysfunction.    Left Atrium: Normal left atrial size.    Right Ventricle: Normal right ventricular cavity size. Systolic   function is normal.    Aortic Valve: The aortic valve is a trileaflet valve. There is mild   aortic regurgitation with a centrally directed jet.    Mitral Valve: There is no stenosis. The mean pressure gradient across   the mitral valve is 3 mmHg at a heart rate of  bpm. There is mild   regurgitation.    Tricuspid Valve: There is mild transvalvular regurgitation with a   centrally directed jet.    Pulmonic Valve: There is no significant stenosis.

## 2023-08-09 NOTE — ED PROVIDER NOTES
"Encounter Date: 8/8/2023       History     Chief Complaint   Patient presents with    Heartburn     Kerri Morrow is a 58 y.o. Black or  /female presenting to ED via EMS with burning in upper chest and throat earlier today. Sx started after eating a chicken sandwich and onions. She states that she thought it was reflux so she took 2 Tums and sx resolved. She also reports that she has not taken her Protonix in over a week. States that she was recently diagnosed with HF and thought she was told to stop her Protonix. Upon review of AVS, patient was instructed to continue her Protonix. She has no complaints at this time. Denies CP, shortness of breath, N/V or diaphoresis at any point. Currently in NAD. VSS at this time.      The history is provided by the patient and the EMS personnel.     Review of patient's allergies indicates:   Allergen Reactions    Pcn [penicillins] Rash     Past Medical History:   Diagnosis Date    Hypertension     Stroke      History reviewed. No pertinent surgical history.  History reviewed. No pertinent family history.  Social History     Tobacco Use    Smoking status: Never    Smokeless tobacco: Current     Types: Chew, Snuff   Substance Use Topics    Alcohol use: Yes     Comment: beer "almost every day", states 2-3 quarts of beer a day but states "has slacked up now"    Drug use: Never     Review of Systems   Constitutional:  Negative for activity change, appetite change, chills, diaphoresis, fatigue and fever.   Respiratory:  Negative for cough, chest tightness and shortness of breath.    Cardiovascular:  Negative for chest pain and palpitations.   Gastrointestinal:  Negative for abdominal distention, abdominal pain, diarrhea, nausea and vomiting.        Upper chest into throat "burning"   Skin:  Negative for color change and pallor.   Neurological:  Negative for dizziness, weakness and light-headedness.   Psychiatric/Behavioral:  Negative for confusion. The patient is not " nervous/anxious.    All other systems reviewed and are negative.      Physical Exam     Initial Vitals [08/08/23 1845]   BP Pulse Resp Temp SpO2   132/75 81 18 98.8 °F (37.1 °C) 99 %      MAP       --         Physical Exam    Nursing note and vitals reviewed.  Constitutional: She appears well-developed and well-nourished. She is not diaphoretic. No distress.   HENT:   Head: Normocephalic.   Mouth/Throat: Oropharynx is clear and moist.   Eyes: Conjunctivae and EOM are normal. Pupils are equal, round, and reactive to light. No scleral icterus.   Neck: Neck supple. No tracheal deviation present. No JVD present.   Normal range of motion.  Cardiovascular:  Normal rate, regular rhythm, normal heart sounds and intact distal pulses.           Pulmonary/Chest: Breath sounds normal. No stridor. No respiratory distress. She exhibits no tenderness.   Abdominal: Abdomen is soft. Bowel sounds are normal. She exhibits no distension. There is no abdominal tenderness. There is no rebound and no guarding.   Musculoskeletal:         General: No tenderness or edema. Normal range of motion.      Cervical back: Normal range of motion and neck supple.     Lymphadenopathy:     She has no cervical adenopathy.   Neurological: She is alert and oriented to person, place, and time. She has normal strength. GCS score is 15. GCS eye subscore is 4. GCS verbal subscore is 5. GCS motor subscore is 6.   Skin: Skin is warm and dry. Capillary refill takes less than 2 seconds.         Medical Screening Exam   See Full Note    ED Course   Procedures  Labs Reviewed - No data to display       Imaging Results    None          Medications - No data to display  Medical Decision Making:   Initial Assessment:   Kerri Morrow is a 58 y.o. Black or  /female presenting to ED via EMS with burning in upper chest and throat earlier today. Sx started after eating a chicken sandwich and onions. She states that she thought it was reflux so she took 2  Tums and sx resolved. She also reports that she has not taken her Protonix in over a week. States that she was recently diagnosed with HF and thought she was told to stop her Protonix. Upon review of AVS, patient was instructed to continue her Protonix. She has no complaints at this time. Denies CP, shortness of breath, N/V or diaphoresis at any point. Currently in NAD. VSS at this time.    Differential Diagnosis:   GERD, MI, Pneumonia, CHF exacerbation  ED Management:  Patient refused work-up    Refill on Protonix  At this time, patient is refusing further work-up stating that sx have completely resolved. Refill for Protonix sent to pharmacy. I feel that patient has reached maximum benefit from ED evaluation, treatment and observation. I thoroughly explained all findings to patient to the best of my ability and answered all questions to their satisfaction. I educated patient on the general/expected course of the condition and treatment options in ED and after discharge. I also informed patient that our evaluation in the emergency department today is an evaluation of the condition in one moment in time. If there is any worsening of the condition of if symptoms persist, the patient has been instructed to return to the ED immediately for further evaluation. Patient has also been advised to follow up with PCP in 2-3 days for re-evaluation. Patient verbalized understanding of the instructions and is agreeable to disposition. No questions or concerns at this time.     Dx: GERD             ED Course as of 08/08/23 1928 Tue Aug 08, 2023   1919 Discussed work-up with patient and she is refusing at this time. States that she feels better and knows it was her reflux. She has been given strict return precautions and v/u. No questions or concerns at this time. She is requesting a refill on her Protonix. [LP]      ED Course User Index  [LP] Fabiola Squires, TODD                Clinical Impression:   Final diagnoses:  [R12]  Heartburn (Primary)        ED Disposition Condition    Discharge Stable          ED Prescriptions       Medication Sig Dispense Start Date End Date Auth. Provider    pantoprazole (PROTONIX) 40 MG tablet Take 1 tablet (40 mg total) by mouth once daily. 30 tablet 8/8/2023 -- Fabiola Squires, TODD          Follow-up Information    None          Fabiola Squires, TODD  08/08/23 1921

## 2023-08-09 NOTE — DISCHARGE SUMMARY
[facility]  [facility]  Discharge Summary      Patient Name: Kerri Morrow  MRN: [id number]  CRISS: [id number]  Patient Class: IP- Inpatient  Admission Date: 7/31/2023  Hospital Length of Stay: 3 days  Discharge Date and Time: 8/3/2023  3:02 PM  Attending Physician: Tootie att. providers found   Discharging Provider: Jenifer De La Torre DO  Primary Care Provider: Tootie, Primary Doctor    Primary Care Team: Networked reference to record PCT     HPI:   Kerri Morrow is a 57-year-old female with history of HTN and EtOH use disorder, received as a transfer from [facility]. Patient initially presented with shortness of breath which she states began suddenly this morning. States she has also noted a decreased tolerance to activity and is unable to lie flat to sleep due to shortness of breath. These symptoms have been going on for some time but patient unable to provide specific onset. She does have bilateral lower extremity edema which is chronic. She denies any chest pain, nausea, or diaphoresis.     Initial evaluation with the following: troponin WNL, pBNP 454 (baseline unknown), D-dimer 1.13, macrocytosis with Hgb/Hct WN, UA with WBC 11-15. CXR with mild interstitial edema. Significant HTN with vital signs otherwise unremarkable.     Patient to be admitted to hospital service for further evaluation and management.       * No surgery found *      Hospital Course:   Patient admitted with shortness of breath, hypertension with elevated pBNP and elevate d-dimer. Hospital course uncomplicated. Presenting symptoms resolved with diuresis and blood pressure management. Bilateral LE doppler with no evidence of DVT. Echocardiogram with diastolic dysfunction, EF 60-65%, mild valvular regurgitation. Nuclear stress test performed with no symptoms, EKG changes, or ischemia noted on nuclear imaging. Recommendations for medical management and outpatient follow up. At time of discharge, patient was at baseline  function with no complaints. Vital signs grossly WNL, labs unremarkable/patient baseline.     At this time,Kerri Morrow has reached maximum benefit of hospitalization and will be discharged home with family. Follow up with PCP, follow up with cardiology. Discharge instructions and education provided, questions addressed.         Goals of Care Treatment Preferences:  Code Status: Full Code      Consults:   Consults (From admission, onward)        Status Ordering Provider     Inpatient consult to Cardiology  Once        Provider:  Indra Thompson, DO    PILY Velasco          Psychiatric  Alcohol use disorder  Encourage cessation      Cardiac/Vascular  * Hypertensive heart disease with chronic diastolic congestive heart failure  Discharge medications: ARB, CCB, furosemide.     Stable and controlled at time of discharge. Follow up with PCP, cardiology. Encourage blood pressure control and alcohol/tobacco cessation.     Patient is identified as having Diastolic (HFpEF) heart failure that is Acute. CHF is currently controlled. Latest ECHO performed and demonstrates-    Echo    Result Date: 8/2/2023    Left Ventricle: The left ventricle is normal in size. Increased wall   thickness. There is concentric remodeling. Normal wall motion. There is   normal systolic function with a visually estimated ejection fraction of 60   - 65%. Grade I diastolic dysfunction.    Left Atrium: Normal left atrial size.    Right Ventricle: Normal right ventricular cavity size. Systolic   function is normal.    Aortic Valve: The aortic valve is a trileaflet valve. There is mild   aortic regurgitation with a centrally directed jet.    Mitral Valve: There is no stenosis. The mean pressure gradient across   the mitral valve is 3 mmHg at a heart rate of  bpm. There is mild   regurgitation.    Tricuspid Valve: There is mild transvalvular regurgitation with a   centrally directed jet.    Pulmonic Valve: There is no  significant stenosis.          Oncology  Macrocytosis without anemia  Likely secondary to alcohol use, encourage cessation. Follow up with PCP, monitor B12 and folate.         Final Active Diagnoses:    Diagnosis Date Noted POA    PRINCIPAL PROBLEM:  Hypertensive heart disease with chronic diastolic congestive heart failure [I11.0, I50.32] 07/31/2023 Yes    Macrocytosis without anemia [D75.89] 07/31/2023 Yes    Alcohol use disorder [F10.90] 07/31/2023 Yes      Problems Resolved During this Admission:    Diagnosis Date Noted Date Resolved POA    Positive D dimer [R79.89] 07/31/2023 08/03/2023 Yes    SOB (shortness of breath) [R06.02] 07/31/2023 08/03/2023 Yes    Acute cystitis without hematuria [N30.00] 07/31/2023 08/03/2023 Yes       Discharged Condition: good    Disposition: Home or Self Care    Follow Up:   Contact information for follow-up providers     Reza, Corina, NP. Go on 8/18/2023.    Specialty: Geriatric Medicine  Why: 1 week hosp/fu: APPT: August 18, 2023 at 10:45 am  Contact information:  [address] [place] [id number]  [phone]             Rod Cai ACNP Follow up in 2 week(s).    Specialty: Cardiology  Why: Hospital discharge   Please follow up with Gema on August 23 at 2:00  Contact information:  [address] [place] [address]  [phone]                   Contact information for after-discharge care     Home Medical Care     [facility] .    Service: Home Health Services  Contact information:  [address] [facility] [address]  [phone]                           Patient Instructions:      Ambulatory referral/consult to Cardiology   Standing Status: Future   Referral Priority: Routine Referral Type: Consultation   Referral Reason: Specialty Services Required   Referred to Provider: ROD CAI Requested Specialty: Cardiology   Number of Visits Requested: 1     Diet Cardiac     Notify your health care provider if you  experience any of the following:  temperature >100.4     Notify your health care provider if you experience any of the following:  persistent nausea and vomiting or diarrhea     Notify your health care provider if you experience any of the following:  severe uncontrolled pain     Notify your health care provider if you experience any of the following:  redness, tenderness, or signs of infection (pain, swelling, redness, odor or green/yellow discharge around incision site)     Notify your health care provider if you experience any of the following:  difficulty breathing or increased cough     Notify your health care provider if you experience any of the following:  severe persistent headache     Notify your health care provider if you experience any of the following:  worsening rash     Notify your health care provider if you experience any of the following:  persistent dizziness, light-headedness, or visual disturbances     Notify your health care provider if you experience any of the following:  increased confusion or weakness     Activity as tolerated       Significant Diagnostic Studies: Labs: All labs within the past 24 hours have been reviewed    Pending Diagnostic Studies:     None         Medications:  Reconciled Home Medications:      Medication List      START taking these medications    amLODIPine 10 MG tablet  Commonly known as: NORVASC  Take 1 tablet (10 mg total) by mouth once daily.     furosemide 40 MG tablet  Commonly known as: LASIX  Take 1 tablet (40 mg total) by mouth once daily.     losartan 25 MG tablet  Commonly known as: COZAAR  Take 1 tablet (25 mg total) by mouth once daily.        CONTINUE taking these medications    aspirin 81 MG Chew  Take 81 mg by mouth once daily.        STOP taking these medications    levoFLOXacin 500 MG tablet  Commonly known as: LEVAQUIN     metoprolol tartrate 50 MG tablet  Commonly known as: LOPRESSOR            Indwelling Lines/Drains at time of discharge:    Lines/Drains/Airways     None                 Time spent on the discharge of patient: 40 minutes         Jenifer De La Torre DO  Department of Hospital Medicine  [facility]

## 2023-08-09 NOTE — ASSESSMENT & PLAN NOTE
Likely secondary to alcohol use, encourage cessation. Follow up with PCP, monitor B12 and folate.

## 2023-09-12 NOTE — PROGRESS NOTES
PCP: Tootie, Primary Doctor    Referring Provider:     Subjective:   Kerri Morrow is a 58 y.o. female with hx of CVA, HTN, Alcohol dependance who presents for hospital follow up. Pt was admitted to the hospital for c/o SOB. Found to be hypertensive with elevated pBNP. She was seen by cardiology 08/01/23 for hypertensive heart disease with Grade I LV diastolic dysfunction per echo. She underwent Lexiscan which was normal.     On exam, pt denies chest pain or SOB. She is seen sitting in a wheelchair, awake, alert, oriented x 4. She has a family member present with her today. She has residual left sided weakness as a result of her previous CVA. Left lower extremity with trace edema, no edema to right lower extremity.         Fhx: None  Shx: Uses smokeless tobacco, daily ETOH use, no illicit drug use    EKG   Results for orders placed or performed during the hospital encounter of 07/31/23   EKG 12-lead    Collection Time: 07/31/23 11:31 AM    Narrative    Test Reason : R06.00,    Vent. Rate : 084 BPM     Atrial Rate : 084 BPM     P-R Int : 144 ms          QRS Dur : 100 ms      QT Int : 386 ms       P-R-T Axes : 019 -08 047 degrees     QTc Int : 456 ms    Normal sinus rhythm  Possible Left atrial enlargement  LVH  Cannot rule out Septal infarct (cited on or before 17-OCT-2022)  Abnormal ECG  When compared with ECG of 17-OCT-2022 18:22,  No significant change was found  Confirmed by Talib Small DO ([id number]) on 9/14/2023 4:20:02 PM    Referred By: [email or web address]   SELF           Confirmed By:Talib Small DO     ECHO Results for orders placed during the hospital encounter of 07/31/23    Echo    Interpretation Summary    Left Ventricle: The left ventricle is normal in size. Increased wall thickness. There is concentric remodeling. Normal wall motion. There is normal systolic function with a visually estimated ejection fraction of 60 - 65%. Grade I diastolic dysfunction.    Left Atrium: Normal left atrial size.    Right  Ventricle: Normal right ventricular cavity size. Systolic function is normal.    Aortic Valve: The aortic valve is a trileaflet valve. There is mild aortic regurgitation with a centrally directed jet.    Mitral Valve: There is no stenosis. The mean pressure gradient across the mitral valve is 3 mmHg at a heart rate of  bpm. There is mild regurgitation.    Tricuspid Valve: There is mild transvalvular regurgitation with a centrally directed jet.    Pulmonic Valve: There is no significant stenosis.        Lexiscan 08/02/23 - normal      [facility] No results found for this or any previous visit.      Lab Results   Component Value Date     09/13/2023    K 3.9 09/13/2023     (H) 09/13/2023    CO2 30 09/13/2023    BUN 14 09/13/2023    CREATININE 0.82 09/13/2023    CALCIUM 8.8 09/13/2023    ANIONGAP 6 (L) 09/13/2023       Lab Results   Component Value Date    CHOL 174 08/02/2023     Lab Results   Component Value Date    HDL 46 08/02/2023     Lab Results   Component Value Date    LDLCALC 90 08/02/2023     Lab Results   Component Value Date    TRIG 189 (H) 08/02/2023     Lab Results   Component Value Date    CHOLHDL 3.8 08/02/2023       Lab Results   Component Value Date    WBC 5.29 08/03/2023    HGB 12.5 08/03/2023    HCT 37.3 (L) 08/03/2023    .8 (H) 08/03/2023     08/03/2023           Current Outpatient Medications:     aspirin 81 MG Chew, Take 81 mg by mouth once daily., Disp: , Rfl:     furosemide (LASIX) 40 MG tablet, Take 1 tablet (40 mg total) by mouth once daily., Disp: 30 tablet, Rfl: 0    losartan (COZAAR) 25 MG tablet, Take 1 tablet (25 mg total) by mouth once daily., Disp: 30 tablet, Rfl: 0    pantoprazole (PROTONIX) 40 MG tablet, Take 1 tablet (40 mg total) by mouth once daily., Disp: 30 tablet, Rfl: 2    rosuvastatin (CRESTOR) 10 MG tablet, Take 10 mg by mouth once daily., Disp: , Rfl:     amLODIPine (NORVASC) 10 MG tablet, Take 1 tablet (10 mg total) by mouth once daily. (Patient not  "taking: Reported on 9/13/2023), Disp: 30 tablet, Rfl: 0    metoprolol succinate (TOPROL-XL) 25 MG 24 hr tablet, Take 1 tablet (25 mg total) by mouth once daily., Disp: 30 tablet, Rfl: 6    Review of Systems   Constitutional:  Negative for chills, malaise/fatigue and weight loss.   Cardiovascular:  Negative for chest pain, palpitations, orthopnea, leg swelling and PND.   Gastrointestinal:  Negative for abdominal pain, nausea and vomiting.   Musculoskeletal:  Negative for falls.   Neurological:  Negative for dizziness.        Chronic left sided weakness due to previous CVA  Chronic left lower extremity edema         Objective:   /80 (BP Location: Right arm, Patient Position: Sitting)   Pulse 83   Ht 5' 2" (1.575 m)   Wt 107.5 kg (237 lb)   SpO2 98%   BMI 43.35 kg/m²     Physical Exam  Constitutional:       General: She is not in acute distress.     Appearance: Normal appearance.   Eyes:      Pupils: Pupils are equal, round, and reactive to light.   Cardiovascular:      Rate and Rhythm: Normal rate and regular rhythm.   Pulmonary:      Effort: Pulmonary effort is normal.      Breath sounds: Normal breath sounds.   Abdominal:      Palpations: Abdomen is soft.   Musculoskeletal:      Cervical back: Neck supple. No rigidity.      Right lower leg: No edema.      Comments: Trace edema to left lower extremity   Skin:     General: Skin is warm and dry.   Neurological:      Mental Status: She is alert and oriented to person, place, and time.   Psychiatric:         Mood and Affect: Mood normal.         Behavior: Behavior normal.           Assessment:     1. Hypertensive heart disease with chronic diastolic congestive heart failure  rosuvastatin (CRESTOR) 10 MG tablet    Basic Metabolic Panel    metoprolol succinate (TOPROL-XL) 25 MG 24 hr tablet      2. Congestive heart failure, unspecified HF chronicity, unspecified heart failure type        3. Essential hypertension        4. Alcohol use disorder        5. " Hyperlipidemia, unspecified hyperlipidemia type              Plan:   Hypertensive heart disease with chronic diastolic congestive heart failure  - echo showed normal EF with Grade I LVDD  - continue amlodipine 10mg daily, lasix 40mg daily and losartan 25mg daily  - metoprolol was discontinued at discharge for unclear reason  - restart Toprol XL 25mg daily  - BMP today  - f/u with Dr. Thompson in 2 months    Essential hypertension  - continue amlodipine 10mg daily, losartan 25mg daily and lasix 40mg daily  - start Toprol XL 25mg daily (was taking in hospital but was discontinued at discharge for unclear reason)    Alcohol use disorder  - she reports she has decreased use of ETOH     Hyperlipemia  - continue statin

## 2023-09-13 ENCOUNTER — OFFICE VISIT (OUTPATIENT)
Dept: CARDIOLOGY | Facility: CLINIC | Age: 58
End: 2023-09-13
Payer: MEDICARE

## 2023-09-13 VITALS
HEART RATE: 83 BPM | HEIGHT: 62 IN | OXYGEN SATURATION: 98 % | DIASTOLIC BLOOD PRESSURE: 80 MMHG | BODY MASS INDEX: 43.61 KG/M2 | SYSTOLIC BLOOD PRESSURE: 130 MMHG | WEIGHT: 237 LBS

## 2023-09-13 DIAGNOSIS — I10 ESSENTIAL HYPERTENSION: ICD-10-CM

## 2023-09-13 DIAGNOSIS — I11.0 HYPERTENSIVE HEART DISEASE WITH CHRONIC DIASTOLIC CONGESTIVE HEART FAILURE: Primary | ICD-10-CM

## 2023-09-13 DIAGNOSIS — E78.5 HYPERLIPIDEMIA, UNSPECIFIED HYPERLIPIDEMIA TYPE: ICD-10-CM

## 2023-09-13 DIAGNOSIS — I50.9 CONGESTIVE HEART FAILURE, UNSPECIFIED HF CHRONICITY, UNSPECIFIED HEART FAILURE TYPE: ICD-10-CM

## 2023-09-13 DIAGNOSIS — F10.90 ALCOHOL USE DISORDER: ICD-10-CM

## 2023-09-13 DIAGNOSIS — I50.32 HYPERTENSIVE HEART DISEASE WITH CHRONIC DIASTOLIC CONGESTIVE HEART FAILURE: Primary | ICD-10-CM

## 2023-09-13 PROCEDURE — 3079F DIAST BP 80-89 MM HG: CPT | Mod: CPTII,,, | Performed by: NURSE PRACTITIONER

## 2023-09-13 PROCEDURE — 3008F BODY MASS INDEX DOCD: CPT | Mod: CPTII,,, | Performed by: NURSE PRACTITIONER

## 2023-09-13 PROCEDURE — 1159F MED LIST DOCD IN RCRD: CPT | Mod: CPTII,,, | Performed by: NURSE PRACTITIONER

## 2023-09-13 PROCEDURE — 3079F PR MOST RECENT DIASTOLIC BLOOD PRESSURE 80-89 MM HG: ICD-10-PCS | Mod: CPTII,,, | Performed by: NURSE PRACTITIONER

## 2023-09-13 PROCEDURE — 4010F PR ACE/ARB THEARPY RXD/TAKEN: ICD-10-PCS | Mod: CPTII,,, | Performed by: NURSE PRACTITIONER

## 2023-09-13 PROCEDURE — 3075F PR MOST RECENT SYSTOLIC BLOOD PRESS GE 130-139MM HG: ICD-10-PCS | Mod: CPTII,,, | Performed by: NURSE PRACTITIONER

## 2023-09-13 PROCEDURE — 99213 OFFICE O/P EST LOW 20 MIN: CPT | Mod: S$PBB,,, | Performed by: NURSE PRACTITIONER

## 2023-09-13 PROCEDURE — 3075F SYST BP GE 130 - 139MM HG: CPT | Mod: CPTII,,, | Performed by: NURSE PRACTITIONER

## 2023-09-13 PROCEDURE — 99213 OFFICE O/P EST LOW 20 MIN: CPT | Mod: PBBFAC | Performed by: NURSE PRACTITIONER

## 2023-09-13 PROCEDURE — 4010F ACE/ARB THERAPY RXD/TAKEN: CPT | Mod: CPTII,,, | Performed by: NURSE PRACTITIONER

## 2023-09-13 PROCEDURE — 1159F PR MEDICATION LIST DOCUMENTED IN MEDICAL RECORD: ICD-10-PCS | Mod: CPTII,,, | Performed by: NURSE PRACTITIONER

## 2023-09-13 PROCEDURE — 99213 PR OFFICE/OUTPT VISIT, EST, LEVL III, 20-29 MIN: ICD-10-PCS | Mod: S$PBB,,, | Performed by: NURSE PRACTITIONER

## 2023-09-13 PROCEDURE — 3008F PR BODY MASS INDEX (BMI) DOCUMENTED: ICD-10-PCS | Mod: CPTII,,, | Performed by: NURSE PRACTITIONER

## 2023-09-13 RX ORDER — ROSUVASTATIN CALCIUM 10 MG/1
10 TABLET, COATED ORAL DAILY
COMMUNITY

## 2023-09-20 ENCOUNTER — TELEPHONE (OUTPATIENT)
Dept: CARDIOLOGY | Facility: CLINIC | Age: 58
End: 2023-09-20
Payer: MEDICARE

## 2023-09-20 PROBLEM — E78.5 HYPERLIPEMIA: Status: ACTIVE | Noted: 2023-09-20

## 2023-09-20 RX ORDER — METOPROLOL SUCCINATE 25 MG/1
25 TABLET, EXTENDED RELEASE ORAL DAILY
Qty: 30 TABLET | Refills: 6 | Status: SHIPPED | OUTPATIENT
Start: 2023-09-20 | End: 2024-09-19

## 2023-09-20 NOTE — ASSESSMENT & PLAN NOTE
- continue amlodipine 10mg daily, losartan 25mg daily and lasix 40mg daily  - start Toprol XL 25mg daily (was taking in hospital but was discontinued at discharge for unclear reason)

## 2023-09-20 NOTE — TELEPHONE ENCOUNTER
----- Message from TODD Cardona sent at 9/20/2023 12:41 PM CDT -----  Please call pt and let her know her labs look good and I called her in a medicine to her pharmacy - Toprol XL 25mg daily. She needs to start taking that medication and then she will see Dr. Thompson in November.       Informed pt of these results and medication. Pt voiced understanding.

## 2023-09-20 NOTE — ASSESSMENT & PLAN NOTE
- echo showed normal EF with Grade I LVDD  - continue amlodipine 10mg daily, lasix 40mg daily and losartan 25mg daily  - metoprolol was discontinued at discharge for unclear reason  - restart Toprol XL 25mg daily  - BMP today  - f/u with Dr. Thompson in 2 months

## 2023-09-29 DIAGNOSIS — G47.30 SLEEP APNEA, UNSPECIFIED: Primary | ICD-10-CM

## 2023-11-07 ENCOUNTER — OFFICE VISIT (OUTPATIENT)
Dept: CARDIOLOGY | Facility: CLINIC | Age: 58
End: 2023-11-07
Payer: MEDICARE

## 2023-11-07 VITALS
BODY MASS INDEX: 42.81 KG/M2 | DIASTOLIC BLOOD PRESSURE: 90 MMHG | SYSTOLIC BLOOD PRESSURE: 160 MMHG | HEART RATE: 80 BPM | WEIGHT: 232.63 LBS | HEIGHT: 62 IN

## 2023-11-07 DIAGNOSIS — I10 ESSENTIAL HYPERTENSION: ICD-10-CM

## 2023-11-07 DIAGNOSIS — R00.2 PALPITATIONS: Primary | ICD-10-CM

## 2023-11-07 PROCEDURE — 3077F PR MOST RECENT SYSTOLIC BLOOD PRESSURE >= 140 MM HG: ICD-10-PCS | Mod: CPTII,,, | Performed by: INTERNAL MEDICINE

## 2023-11-07 PROCEDURE — 99213 OFFICE O/P EST LOW 20 MIN: CPT | Mod: S$PBB,,, | Performed by: INTERNAL MEDICINE

## 2023-11-07 PROCEDURE — 93242 EXT ECG>48HR<7D RECORDING: CPT | Performed by: INTERNAL MEDICINE

## 2023-11-07 PROCEDURE — 3077F SYST BP >= 140 MM HG: CPT | Mod: CPTII,,, | Performed by: INTERNAL MEDICINE

## 2023-11-07 PROCEDURE — 1159F MED LIST DOCD IN RCRD: CPT | Mod: CPTII,,, | Performed by: INTERNAL MEDICINE

## 2023-11-07 PROCEDURE — 93005 ELECTROCARDIOGRAM TRACING: CPT | Mod: PBBFAC,59 | Performed by: INTERNAL MEDICINE

## 2023-11-07 PROCEDURE — 93010 ELECTROCARDIOGRAM REPORT: CPT | Mod: S$PBB,,, | Performed by: INTERNAL MEDICINE

## 2023-11-07 PROCEDURE — 3080F DIAST BP >= 90 MM HG: CPT | Mod: CPTII,,, | Performed by: INTERNAL MEDICINE

## 2023-11-07 PROCEDURE — 99213 PR OFFICE/OUTPT VISIT, EST, LEVL III, 20-29 MIN: ICD-10-PCS | Mod: S$PBB,,, | Performed by: INTERNAL MEDICINE

## 2023-11-07 PROCEDURE — 1160F RVW MEDS BY RX/DR IN RCRD: CPT | Mod: CPTII,,, | Performed by: INTERNAL MEDICINE

## 2023-11-07 PROCEDURE — 99214 OFFICE O/P EST MOD 30 MIN: CPT | Mod: PBBFAC | Performed by: INTERNAL MEDICINE

## 2023-11-07 PROCEDURE — 93010 EKG 12-LEAD: ICD-10-PCS | Mod: S$PBB,,, | Performed by: INTERNAL MEDICINE

## 2023-11-07 PROCEDURE — 4010F PR ACE/ARB THEARPY RXD/TAKEN: ICD-10-PCS | Mod: CPTII,,, | Performed by: INTERNAL MEDICINE

## 2023-11-07 PROCEDURE — 3008F PR BODY MASS INDEX (BMI) DOCUMENTED: ICD-10-PCS | Mod: CPTII,,, | Performed by: INTERNAL MEDICINE

## 2023-11-07 PROCEDURE — 1160F PR REVIEW ALL MEDS BY PRESCRIBER/CLIN PHARMACIST DOCUMENTED: ICD-10-PCS | Mod: CPTII,,, | Performed by: INTERNAL MEDICINE

## 2023-11-07 PROCEDURE — 3080F PR MOST RECENT DIASTOLIC BLOOD PRESSURE >= 90 MM HG: ICD-10-PCS | Mod: CPTII,,, | Performed by: INTERNAL MEDICINE

## 2023-11-07 PROCEDURE — 1159F PR MEDICATION LIST DOCUMENTED IN MEDICAL RECORD: ICD-10-PCS | Mod: CPTII,,, | Performed by: INTERNAL MEDICINE

## 2023-11-07 PROCEDURE — 4010F ACE/ARB THERAPY RXD/TAKEN: CPT | Mod: CPTII,,, | Performed by: INTERNAL MEDICINE

## 2023-11-07 PROCEDURE — 3008F BODY MASS INDEX DOCD: CPT | Mod: CPTII,,, | Performed by: INTERNAL MEDICINE

## 2023-11-07 NOTE — PROGRESS NOTES
Cardiology Clinic Note:    PCP: No, Primary Doctor    REFERRING PHYSICIAN:     CHIEF COMPLAINT:     HISTORY OF PRESENT ILLNESS:  Kerri Morrow is a 58 y.o. female with PMH of HTN, HLD, and Stroke. She presented to the clinic for a one month follow up and evaluation of palpitations and SOB. Patient reported that she has had a few episodes of palpitations in last one month. She describes palpitations as flutter like sensation in her chest. They last for few seconds and resolve on their own. She denies any accompanying chest pain, SOB, dizziness, lightheadedness, flushing, or nausea.  She is wheelchair bound and doesn't engage in physical activity but reports of getting SOB when she walks from her room to the bathroom. SOB lasts for few minutes and is resolved by resting.  She complains of bilateral leg swelling that is worse at the end of the day.   She doesn't monitor BP at home but is compliant with prescribed medications. She denied any headaches, blurred vision, or chest pain at the time of encounter.      Review of Systems:  Review of Systems   Constitutional: Negative for decreased appetite, fever, malaise/fatigue, weight gain and weight loss.   HENT: Negative.     Eyes:  Negative for blurred vision and visual disturbance.   Cardiovascular:  Positive for dyspnea on exertion, leg swelling and palpitations. Negative for chest pain, irregular heartbeat, near-syncope and orthopnea.   Respiratory:  Positive for shortness of breath and snoring. Negative for wheezing.    Endocrine: Negative.    Hematologic/Lymphatic: Negative.    Musculoskeletal:  Negative for falls.          PAST MEDICAL HISTORY:  Past Medical History:   Diagnosis Date    Acid reflux     High cholesterol     Hypertension     Stroke        PAST SURGICAL HISTORY:  History reviewed. No pertinent surgical history.    SOCIAL HISTORY:  Social History     Socioeconomic History    Marital status:    Tobacco Use    Smoking status: Never    Smokeless  "tobacco: Current     Types: Chew, Snuff    Tobacco comments:     1 bag daily   Substance and Sexual Activity    Alcohol use: Yes     Comment: beer "almost every day", states 2-3 quarts of beer a day but states "has slacked up now"    Drug use: Never     Social Determinants of Health     Financial Resource Strain: Low Risk  (8/1/2023)    Overall Financial Resource Strain (CARDIA)     Difficulty of Paying Living Expenses: Not hard at all   Food Insecurity: No Food Insecurity (8/1/2023)    Hunger Vital Sign     Worried About Running Out of Food in the Last Year: Never true     Ran Out of Food in the Last Year: Never true   Transportation Needs: No Transportation Needs (8/1/2023)    PRAPARE - Transportation     Lack of Transportation (Medical): No     Lack of Transportation (Non-Medical): No   Physical Activity: Inactive (8/1/2023)    Exercise Vital Sign     Days of Exercise per Week: 0 days     Minutes of Exercise per Session: 0 min   Stress: No Stress Concern Present (8/1/2023)    [employer] - Occupational Stress Questionnaire     Feeling of Stress : Not at all   Social Connections: Socially Integrated (8/1/2023)    Social Connection and Isolation Panel [NHANES]     Frequency of Communication with Friends and Family: More than three times a week     Frequency of Social Gatherings with Friends and Family: More than three times a week     Attends Gnosticist Services: More than 4 times per year     Active Member of Clubs or Organizations: Yes     Attends Club or Organization Meetings: More than 4 times per year     Marital Status:    Housing Stability: Low Risk  (8/1/2023)    Housing Stability Vital Sign     Unable to Pay for Housing in the Last Year: No     Number of Places Lived in the Last Year: 1     Unstable Housing in the Last Year: No       FAMILY HISTORY:  History reviewed. No pertinent family history.    ALLERGIES:  Allergies as of 11/07/2023 - Reviewed 11/07/2023   Allergen " "Reaction Noted    Pcn [penicillins] Rash 10/17/2022    Iodinated contrast media Itching 09/13/2023         MEDICATIONS:  Current Outpatient Medications on File Prior to Visit   Medication Sig Dispense Refill    aspirin 81 MG Chew Take 81 mg by mouth once daily.      furosemide (LASIX) 40 MG tablet Take 1 tablet (40 mg total) by mouth once daily. 30 tablet 0    losartan (COZAAR) 25 MG tablet Take 1 tablet (25 mg total) by mouth once daily. 30 tablet 0    metoprolol succinate (TOPROL-XL) 25 MG 24 hr tablet Take 1 tablet (25 mg total) by mouth once daily. 30 tablet 6    pantoprazole (PROTONIX) 40 MG tablet Take 1 tablet (40 mg total) by mouth once daily. 30 tablet 2    rosuvastatin (CRESTOR) 10 MG tablet Take 10 mg by mouth once daily.      amLODIPine (NORVASC) 10 MG tablet Take 1 tablet (10 mg total) by mouth once daily. (Patient not taking: Reported on 9/13/2023) 30 tablet 0     No current facility-administered medications on file prior to visit.          PHYSICAL EXAM:  Blood pressure (!) 160/90, pulse 80, height 5' 2" (1.575 m), weight 105.5 kg (232 lb 9.6 oz).  Wt Readings from Last 3 Encounters:   11/07/23 105.5 kg (232 lb 9.6 oz)   09/13/23 107.5 kg (237 lb)   08/08/23 106.6 kg (235 lb)      Body mass index is 42.54 kg/m².    Physical Exam  Vitals and nursing note reviewed.   Constitutional:       General: She is not in acute distress.     Appearance: Normal appearance. She is obese. She is not ill-appearing, toxic-appearing or diaphoretic.   HENT:      Right Ear: Tympanic membrane and ear canal normal.      Left Ear: Tympanic membrane and ear canal normal.      Nose: Nose normal. No congestion or rhinorrhea.      Mouth/Throat:      Mouth: Mucous membranes are moist.      Pharynx: Oropharynx is clear.   Eyes:      Extraocular Movements: Extraocular movements intact.      Conjunctiva/sclera: Conjunctivae normal.      Pupils: Pupils are equal, round, and reactive to light.   Cardiovascular:      Rate and Rhythm: " Normal rate and regular rhythm.      Pulses: Normal pulses.      Heart sounds: Murmur heard.   Pulmonary:      Effort: Pulmonary effort is normal. No respiratory distress.      Breath sounds: Normal breath sounds. No wheezing.   Musculoskeletal:         General: No tenderness. Normal range of motion.      Cervical back: Normal range of motion.      Right lower leg: Edema present.      Left lower leg: Edema present.   Skin:     Capillary Refill: Capillary refill takes less than 2 seconds.   Neurological:      General: No focal deficit present.      Mental Status: She is alert and oriented to person, place, and time. Mental status is at baseline.   Psychiatric:         Mood and Affect: Mood normal.         Behavior: Behavior normal.         Thought Content: Thought content normal.          LABS REVIEWED:  Lab Results   Component Value Date    WBC 5.29 08/03/2023    RBC 3.56 (L) 08/03/2023    HGB 12.5 08/03/2023    HCT 37.3 (L) 08/03/2023    .8 (H) 08/03/2023    MCH 35.1 (H) 08/03/2023    MCHC 33.5 08/03/2023    RDW 14.1 08/03/2023     08/03/2023    MPV 10.9 08/03/2023    NRBC 0.0 08/03/2023     Lab Results   Component Value Date     09/13/2023    K 3.9 09/13/2023     (H) 09/13/2023    CO2 30 09/13/2023    BUN 14 09/13/2023     Lab Results   Component Value Date    AST 50 (H) 07/31/2023    ALT 53 07/31/2023     Lab Results   Component Value Date     (H) 09/13/2023     Lab Results   Component Value Date    CHOL 174 08/02/2023    HDL 46 08/02/2023    TRIG 189 (H) 08/02/2023    CHOLHDL 3.8 08/02/2023       CARDIAC STUDIES REVIEWED:    OTHER IMAGING STUDIES REVIEWED:        ASSESSMENT:   Palpitations    Essential hypertension  -     EKG 12-lead; Future          PLAN:  1. Bilateral lower extremity edema, somewhat controlled on current meds  2. Palpitations, unclear etiology, will place on outpatient tele with Daniela, follow up when results available.  3. Hyperlipiemia: controled on Staritn,

## 2023-11-19 ENCOUNTER — PROCEDURE VISIT (OUTPATIENT)
Dept: SLEEP MEDICINE | Facility: HOSPITAL | Age: 58
End: 2023-11-19
Payer: MEDICARE

## 2023-11-19 DIAGNOSIS — G47.30 SLEEP APNEA, UNSPECIFIED: ICD-10-CM

## 2023-11-19 PROCEDURE — 95811 POLYSOM 6/>YRS CPAP 4/> PARM: CPT | Mod: PO

## 2023-11-20 DIAGNOSIS — G47.31 CENTRAL SLEEP APNEA: Primary | ICD-10-CM

## 2023-11-21 NOTE — PROCEDURES
Procedures  The tech report is part of the sleep summary and will be uploaded into [employer].

## 2023-12-04 PROCEDURE — 93244 EXT ECG>48HR<7D REV&INTERPJ: CPT | Mod: ,,, | Performed by: INTERNAL MEDICINE

## 2023-12-04 PROCEDURE — 93244 PR EXT ECG, CONT, > 48 HRS <= 7 DAYS, REVIEW W/INT: ICD-10-PCS | Mod: ,,, | Performed by: INTERNAL MEDICINE

## 2023-12-10 ENCOUNTER — PROCEDURE VISIT (OUTPATIENT)
Dept: SLEEP MEDICINE | Facility: HOSPITAL | Age: 58
End: 2023-12-10
Attending: INTERNAL MEDICINE
Payer: MEDICARE

## 2023-12-10 DIAGNOSIS — G47.31 CENTRAL SLEEP APNEA: ICD-10-CM

## 2023-12-10 PROCEDURE — 95811 POLYSOM 6/>YRS CPAP 4/> PARM: CPT | Mod: PO

## 2023-12-18 ENCOUNTER — OFFICE VISIT (OUTPATIENT)
Dept: CARDIOLOGY | Facility: CLINIC | Age: 58
End: 2023-12-18
Payer: MEDICARE

## 2023-12-18 VITALS
WEIGHT: 232 LBS | SYSTOLIC BLOOD PRESSURE: 130 MMHG | HEIGHT: 62 IN | BODY MASS INDEX: 42.69 KG/M2 | OXYGEN SATURATION: 100 % | DIASTOLIC BLOOD PRESSURE: 66 MMHG | HEART RATE: 64 BPM

## 2023-12-18 DIAGNOSIS — I69.959 HEMIPLEGIA OF NONDOMINANT SIDE AS LATE EFFECT OF CEREBROVASCULAR DISEASE: Primary | ICD-10-CM

## 2023-12-18 DIAGNOSIS — I50.32 HYPERTENSIVE HEART DISEASE WITH CHRONIC DIASTOLIC CONGESTIVE HEART FAILURE: ICD-10-CM

## 2023-12-18 DIAGNOSIS — E78.2 MIXED HYPERLIPIDEMIA: ICD-10-CM

## 2023-12-18 DIAGNOSIS — R00.2 PALPITATIONS: ICD-10-CM

## 2023-12-18 DIAGNOSIS — I50.41 ACUTE COMBINED SYSTOLIC AND DIASTOLIC CONGESTIVE HEART FAILURE: ICD-10-CM

## 2023-12-18 DIAGNOSIS — I11.0 HYPERTENSIVE HEART DISEASE WITH CHRONIC DIASTOLIC CONGESTIVE HEART FAILURE: ICD-10-CM

## 2023-12-18 DIAGNOSIS — I10 ESSENTIAL HYPERTENSION: ICD-10-CM

## 2023-12-18 PROCEDURE — 99213 OFFICE O/P EST LOW 20 MIN: CPT | Mod: PBBFAC | Performed by: INTERNAL MEDICINE

## 2023-12-18 PROCEDURE — 4010F PR ACE/ARB THEARPY RXD/TAKEN: ICD-10-PCS | Mod: CPTII,,, | Performed by: INTERNAL MEDICINE

## 2023-12-18 PROCEDURE — 1159F MED LIST DOCD IN RCRD: CPT | Mod: CPTII,,, | Performed by: INTERNAL MEDICINE

## 2023-12-18 PROCEDURE — 1160F PR REVIEW ALL MEDS BY PRESCRIBER/CLIN PHARMACIST DOCUMENTED: ICD-10-PCS | Mod: CPTII,,, | Performed by: INTERNAL MEDICINE

## 2023-12-18 PROCEDURE — 4010F ACE/ARB THERAPY RXD/TAKEN: CPT | Mod: CPTII,,, | Performed by: INTERNAL MEDICINE

## 2023-12-18 PROCEDURE — 3008F PR BODY MASS INDEX (BMI) DOCUMENTED: ICD-10-PCS | Mod: CPTII,,, | Performed by: INTERNAL MEDICINE

## 2023-12-18 PROCEDURE — 3075F PR MOST RECENT SYSTOLIC BLOOD PRESS GE 130-139MM HG: ICD-10-PCS | Mod: CPTII,,, | Performed by: INTERNAL MEDICINE

## 2023-12-18 PROCEDURE — 99214 OFFICE O/P EST MOD 30 MIN: CPT | Mod: S$PBB,,, | Performed by: INTERNAL MEDICINE

## 2023-12-18 PROCEDURE — 99214 PR OFFICE/OUTPT VISIT, EST, LEVL IV, 30-39 MIN: ICD-10-PCS | Mod: S$PBB,,, | Performed by: INTERNAL MEDICINE

## 2023-12-18 PROCEDURE — 3078F PR MOST RECENT DIASTOLIC BLOOD PRESSURE < 80 MM HG: ICD-10-PCS | Mod: CPTII,,, | Performed by: INTERNAL MEDICINE

## 2023-12-18 PROCEDURE — 1159F PR MEDICATION LIST DOCUMENTED IN MEDICAL RECORD: ICD-10-PCS | Mod: CPTII,,, | Performed by: INTERNAL MEDICINE

## 2023-12-18 PROCEDURE — 3078F DIAST BP <80 MM HG: CPT | Mod: CPTII,,, | Performed by: INTERNAL MEDICINE

## 2023-12-18 PROCEDURE — 3075F SYST BP GE 130 - 139MM HG: CPT | Mod: CPTII,,, | Performed by: INTERNAL MEDICINE

## 2023-12-18 PROCEDURE — 3008F BODY MASS INDEX DOCD: CPT | Mod: CPTII,,, | Performed by: INTERNAL MEDICINE

## 2023-12-18 PROCEDURE — 1160F RVW MEDS BY RX/DR IN RCRD: CPT | Mod: CPTII,,, | Performed by: INTERNAL MEDICINE

## 2023-12-18 NOTE — PROGRESS NOTES
"Cardiology Clinic Note:    PCP: No, Primary Doctor    REFERRING PHYSICIAN:     CHIEF COMPLAINT:     HISTORY OF PRESENT ILLNESS:  Kerri Morrow is a 58 y.o. female with PMH of HTN, HLD, and Stroke. She presented for  follow up and evaluation of palpitations and SOB. Patient reported palpitations are much less frequent, has not noted any symptoms in last week. . She denies  chest pain, SOB, dizziness, lightheadedness, flushing, or nausea.  She is wheelchair bound and doesn't engage in physical activity but reports of getting SOB when she walks from her room to the bathroom. SOB lasts for few minutes and is resolved by resting.  She complains of bilateral leg swelling that is worse at the end of the day, unchanged from previous eval, swelling mostly resolves overnight. .         Review of Systems:  Review of Systems   Constitutional: Negative for decreased appetite, fever, malaise/fatigue, weight gain and weight loss.   HENT: Negative.     Eyes:  Negative for blurred vision and visual disturbance.   Cardiovascular:  Positive for dyspnea on exertion, leg swelling and palpitations. Negative for chest pain, irregular heartbeat, near-syncope and orthopnea.   Respiratory:  Positive for shortness of breath and snoring. Negative for wheezing.    Endocrine: Negative.    Hematologic/Lymphatic: Negative.    Musculoskeletal:  Negative for falls.          PAST MEDICAL HISTORY:  Past Medical History:   Diagnosis Date    Acid reflux     High cholesterol     Hypertension     Stroke        PAST SURGICAL HISTORY:  History reviewed. No pertinent surgical history.    SOCIAL HISTORY:  Social History     Socioeconomic History    Marital status:    Tobacco Use    Smoking status: Never    Smokeless tobacco: Current     Types: Chew, Snuff    Tobacco comments:     1 bag daily   Substance and Sexual Activity    Alcohol use: Yes     Comment: beer "almost every day", states 2-3 quarts of beer a day but states "has slacked up now"    " Drug use: Never     Social Determinants of Health     Financial Resource Strain: Low Risk  (8/1/2023)    Overall Financial Resource Strain (CARDIA)     Difficulty of Paying Living Expenses: Not hard at all   Food Insecurity: No Food Insecurity (8/1/2023)    Hunger Vital Sign     Worried About Running Out of Food in the Last Year: Never true     Ran Out of Food in the Last Year: Never true   Transportation Needs: No Transportation Needs (8/1/2023)    PRAPARE - Transportation     Lack of Transportation (Medical): No     Lack of Transportation (Non-Medical): No   Physical Activity: Inactive (8/1/2023)    Exercise Vital Sign     Days of Exercise per Week: 0 days     Minutes of Exercise per Session: 0 min   Stress: No Stress Concern Present (8/1/2023)    [employer] - Occupational Stress Questionnaire     Feeling of Stress : Not at all   Social Connections: Socially Integrated (8/1/2023)    Social Connection and Isolation Panel [NHANES]     Frequency of Communication with Friends and Family: More than three times a week     Frequency of Social Gatherings with Friends and Family: More than three times a week     Attends Faith Services: More than 4 times per year     Active Member of Clubs or Organizations: Yes     Attends Club or Organization Meetings: More than 4 times per year     Marital Status:    Housing Stability: Low Risk  (8/1/2023)    Housing Stability Vital Sign     Unable to Pay for Housing in the Last Year: No     Number of Places Lived in the Last Year: 1     Unstable Housing in the Last Year: No       FAMILY HISTORY:  History reviewed. No pertinent family history.    ALLERGIES:  Allergies as of 12/18/2023 - Reviewed 12/18/2023   Allergen Reaction Noted    Pcn [penicillins] Rash 10/17/2022    Iodinated contrast media Itching 09/13/2023         MEDICATIONS:  Current Outpatient Medications on File Prior to Visit   Medication Sig Dispense Refill    aspirin 81 MG Chew Take  "81 mg by mouth once daily.      furosemide (LASIX) 40 MG tablet Take 1 tablet (40 mg total) by mouth once daily. 30 tablet 0    losartan (COZAAR) 25 MG tablet Take 1 tablet (25 mg total) by mouth once daily. 30 tablet 0    metoprolol succinate (TOPROL-XL) 25 MG 24 hr tablet Take 1 tablet (25 mg total) by mouth once daily. 30 tablet 6    pantoprazole (PROTONIX) 40 MG tablet Take 1 tablet (40 mg total) by mouth once daily. 30 tablet 2    rosuvastatin (CRESTOR) 10 MG tablet Take 10 mg by mouth once daily.      amLODIPine (NORVASC) 10 MG tablet Take 1 tablet (10 mg total) by mouth once daily. (Patient not taking: Reported on 9/13/2023) 30 tablet 0     No current facility-administered medications on file prior to visit.          PHYSICAL EXAM:  Blood pressure 130/66, pulse 64, height 5' 2" (1.575 m), weight 105.2 kg (232 lb), SpO2 100 %.  Wt Readings from Last 3 Encounters:   12/18/23 105.2 kg (232 lb)   11/07/23 105.5 kg (232 lb 9.6 oz)   09/13/23 107.5 kg (237 lb)      Body mass index is 42.43 kg/m².    Physical Exam  Vitals and nursing note reviewed.   Constitutional:       General: She is not in acute distress.     Appearance: Normal appearance. She is obese. She is not ill-appearing, toxic-appearing or diaphoretic.   HENT:      Right Ear: Tympanic membrane and ear canal normal.      Left Ear: Tympanic membrane and ear canal normal.      Nose: Nose normal. No congestion or rhinorrhea.      Mouth/Throat:      Mouth: Mucous membranes are moist.      Pharynx: Oropharynx is clear.   Eyes:      Extraocular Movements: Extraocular movements intact.      Conjunctiva/sclera: Conjunctivae normal.      Pupils: Pupils are equal, round, and reactive to light.   Cardiovascular:      Rate and Rhythm: Normal rate and regular rhythm.      Pulses: Normal pulses.      Heart sounds: Murmur heard.   Pulmonary:      Effort: Pulmonary effort is normal. No respiratory distress.      Breath sounds: Normal breath sounds. No wheezing. "   Musculoskeletal:         General: No tenderness. Normal range of motion.      Cervical back: Normal range of motion.      Right lower leg: Edema present.      Left lower leg: Edema present.   Skin:     Capillary Refill: Capillary refill takes less than 2 seconds.   Neurological:      General: No focal deficit present.      Mental Status: She is alert and oriented to person, place, and time. Mental status is at baseline.   Psychiatric:         Mood and Affect: Mood normal.         Behavior: Behavior normal.         Thought Content: Thought content normal.          LABS REVIEWED:  Lab Results   Component Value Date    WBC 5.29 08/03/2023    RBC 3.56 (L) 08/03/2023    HGB 12.5 08/03/2023    HCT 37.3 (L) 08/03/2023    .8 (H) 08/03/2023    MCH 35.1 (H) 08/03/2023    MCHC 33.5 08/03/2023    RDW 14.1 08/03/2023     08/03/2023    MPV 10.9 08/03/2023    NRBC 0.0 08/03/2023     Lab Results   Component Value Date     09/13/2023    K 3.9 09/13/2023     (H) 09/13/2023    CO2 30 09/13/2023    BUN 14 09/13/2023     Lab Results   Component Value Date    AST 50 (H) 07/31/2023    ALT 53 07/31/2023     Lab Results   Component Value Date     (H) 09/13/2023     Lab Results   Component Value Date    CHOL 174 08/02/2023    HDL 46 08/02/2023    TRIG 189 (H) 08/02/2023    CHOLHDL 3.8 08/02/2023       CARDIAC STUDIES REVIEWED:    OTHER IMAGING STUDIES REVIEWED:        ASSESSMENT:   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 Bilateral lower extremity edema,  will DC amlodipine, increase to Cozaar to 50 mg q d.   2. Palpitations, rare PVCs, PACs on Zio3., now asymptomatic continue on low dose beta blocker.    3. Hyperlipiemia: controled on Statin  4.  Hypertension, adequate control on current meds  5. Combined systolic and diastolic heart failure, well compensated on current meds, lower ext edemal likely due to amolodipine, rather that CHF     Reevaluate in 4 to 6 weeks.

## 2023-12-19 RX ORDER — LOSARTAN POTASSIUM 50 MG/1
50 TABLET ORAL DAILY
Qty: 90 TABLET | Refills: 4 | Status: SHIPPED | OUTPATIENT
Start: 2023-12-19 | End: 2024-02-07 | Stop reason: ALTCHOICE

## 2024-02-07 ENCOUNTER — OFFICE VISIT (OUTPATIENT)
Dept: CARDIOLOGY | Facility: CLINIC | Age: 59
End: 2024-02-07
Payer: MEDICARE

## 2024-02-07 VITALS
HEIGHT: 62 IN | SYSTOLIC BLOOD PRESSURE: 160 MMHG | HEART RATE: 89 BPM | DIASTOLIC BLOOD PRESSURE: 80 MMHG | OXYGEN SATURATION: 98 % | BODY MASS INDEX: 42.43 KG/M2

## 2024-02-07 DIAGNOSIS — I69.959 HEMIPLEGIA OF NONDOMINANT SIDE AS LATE EFFECT OF CEREBROVASCULAR DISEASE: Primary | ICD-10-CM

## 2024-02-07 DIAGNOSIS — I10 ESSENTIAL HYPERTENSION: ICD-10-CM

## 2024-02-07 DIAGNOSIS — E78.2 MIXED HYPERLIPIDEMIA: ICD-10-CM

## 2024-02-07 DIAGNOSIS — I50.31 ACUTE DIASTOLIC HEART FAILURE: ICD-10-CM

## 2024-02-07 DIAGNOSIS — R00.2 PALPITATIONS: ICD-10-CM

## 2024-02-07 PROCEDURE — 99214 OFFICE O/P EST MOD 30 MIN: CPT | Mod: S$PBB,,, | Performed by: INTERNAL MEDICINE

## 2024-02-07 PROCEDURE — 1160F RVW MEDS BY RX/DR IN RCRD: CPT | Mod: CPTII,,, | Performed by: INTERNAL MEDICINE

## 2024-02-07 PROCEDURE — 3008F BODY MASS INDEX DOCD: CPT | Mod: CPTII,,, | Performed by: INTERNAL MEDICINE

## 2024-02-07 PROCEDURE — 99213 OFFICE O/P EST LOW 20 MIN: CPT | Mod: PBBFAC | Performed by: INTERNAL MEDICINE

## 2024-02-07 PROCEDURE — 3079F DIAST BP 80-89 MM HG: CPT | Mod: CPTII,,, | Performed by: INTERNAL MEDICINE

## 2024-02-07 PROCEDURE — 3077F SYST BP >= 140 MM HG: CPT | Mod: CPTII,,, | Performed by: INTERNAL MEDICINE

## 2024-02-07 PROCEDURE — 1159F MED LIST DOCD IN RCRD: CPT | Mod: CPTII,,, | Performed by: INTERNAL MEDICINE

## 2024-02-07 RX ORDER — LOSARTAN POTASSIUM 100 MG/1
100 TABLET ORAL DAILY
Qty: 90 TABLET | Refills: 3 | Status: SHIPPED | OUTPATIENT
Start: 2024-02-07 | End: 2025-02-06

## 2024-02-07 NOTE — PROGRESS NOTES
"Cardiology Clinic Note:    PCP: No, Primary Doctor    REFERRING PHYSICIAN:     CHIEF COMPLAINT:     HISTORY OF PRESENT ILLNESS:  Kerri Morrow is a 58 y.o. female with PMH of HTN, HLD, and Stroke. She presented for  follow up and evaluation of palpitations and SOB. Patient reported palpitations have resolved. She denies  chest pain, SOB, dizziness, lightheadedness, flushing, or nausea.   She is having swelling in her left leg, swell thoughout the day, does not completely resolve at night.       Review of Systems:  Review of Systems   Constitutional: Negative for decreased appetite, fever, malaise/fatigue, weight gain and weight loss.   HENT: Negative.     Eyes:  Negative for blurred vision and visual disturbance.   Cardiovascular:  Positive for dyspnea on exertion, leg swelling and palpitations. Negative for chest pain, irregular heartbeat, near-syncope and orthopnea.   Respiratory:  Positive for shortness of breath and snoring. Negative for wheezing.    Endocrine: Negative.    Hematologic/Lymphatic: Negative.    Musculoskeletal:  Negative for falls.          PAST MEDICAL HISTORY:  Past Medical History:   Diagnosis Date    Acid reflux     High cholesterol     Hypertension     Stroke        PAST SURGICAL HISTORY:  Past Surgical History:   Procedure Laterality Date    MOUTH SURGERY         SOCIAL HISTORY:  Social History     Socioeconomic History    Marital status:    Tobacco Use    Smoking status: Never    Smokeless tobacco: Current     Types: Chew, Snuff    Tobacco comments:     1 bag daily   Substance and Sexual Activity    Alcohol use: Not Currently     Comment: beer "almost every day", states 2-3 quarts of beer a day but states "has slacked up now"    Drug use: Never     Social Determinants of Health     Financial Resource Strain: Low Risk  (8/1/2023)    Overall Financial Resource Strain (CARDIA)     Difficulty of Paying Living Expenses: Not hard at all   Food Insecurity: No Food Insecurity (8/1/2023) "    Hunger Vital Sign     Worried About Running Out of Food in the Last Year: Never true     Ran Out of Food in the Last Year: Never true   Transportation Needs: No Transportation Needs (8/1/2023)    PRAPARE - Transportation     Lack of Transportation (Medical): No     Lack of Transportation (Non-Medical): No   Physical Activity: Inactive (8/1/2023)    Exercise Vital Sign     Days of Exercise per Week: 0 days     Minutes of Exercise per Session: 0 min   Stress: No Stress Concern Present (8/1/2023)    [employer] [place] [employer] - Occupational Stress Questionnaire     Feeling of Stress : Not at all   Social Connections: Socially Integrated (8/1/2023)    Social Connection and Isolation Panel [NHANES]     Frequency of Communication with Friends and Family: More than three times a week     Frequency of Social Gatherings with Friends and Family: More than three times a week     Attends [employer] Services: More than 4 times per year     Active Member of Clubs or Organizations: Yes     Attends Club or Organization Meetings: More than 4 times per year     Marital Status:    Housing Stability: Low Risk  (8/1/2023)    Housing Stability Vital Sign     Unable to Pay for Housing in the Last Year: No     Number of Places Lived in the Last Year: 1     Unstable Housing in the Last Year: No       FAMILY HISTORY:  History reviewed. No pertinent family history.    ALLERGIES:  Allergies as of 02/07/2024 - Reviewed 02/07/2024   Allergen Reaction Noted    Pcn [penicillins] Rash 10/17/2022    Codeine Other (See Comments) 02/07/2024    Iodinated contrast media Itching 09/13/2023         MEDICATIONS:  Current Outpatient Medications on File Prior to Visit   Medication Sig Dispense Refill    aspirin 81 MG Chew Take 81 mg by mouth once daily.      furosemide (LASIX) 40 MG tablet Take 1 tablet (40 mg total) by mouth once daily. 30 tablet 0    losartan (COZAAR) 50 MG tablet Take 1 tablet (50 mg total) by mouth once daily. 90  "tablet 4    metoprolol succinate (TOPROL-XL) 25 MG 24 hr tablet Take 1 tablet (25 mg total) by mouth once daily. 30 tablet 6    pantoprazole (PROTONIX) 40 MG tablet Take 1 tablet (40 mg total) by mouth once daily. 30 tablet 2    rosuvastatin (CRESTOR) 10 MG tablet Take 10 mg by mouth once daily.       No current facility-administered medications on file prior to visit.          PHYSICAL EXAM:  Blood pressure (!) 160/80, pulse 89, height 5' 2" (1.575 m), SpO2 98 %.  Wt Readings from Last 3 Encounters:   12/18/23 105.2 kg (232 lb)   11/07/23 105.5 kg (232 lb 9.6 oz)   09/13/23 107.5 kg (237 lb)      Body mass index is 42.43 kg/m².    Physical Exam  Vitals and nursing note reviewed.   Constitutional:       General: She is not in acute distress.     Appearance: Normal appearance. She is obese. She is not ill-appearing, toxic-appearing or diaphoretic.   HENT:      Right Ear: Tympanic membrane and ear canal normal.      Left Ear: Tympanic membrane and ear canal normal.      Nose: Nose normal. No congestion or rhinorrhea.      Mouth/Throat:      Mouth: Mucous membranes are moist.      Pharynx: Oropharynx is clear.   Eyes:      Extraocular Movements: Extraocular movements intact.      Conjunctiva/sclera: Conjunctivae normal.      Pupils: Pupils are equal, round, and reactive to light.   Cardiovascular:      Rate and Rhythm: Normal rate and regular rhythm.      Pulses: Normal pulses.      Heart sounds: Murmur heard.   Pulmonary:      Effort: Pulmonary effort is normal. No respiratory distress.      Breath sounds: Normal breath sounds. No wheezing.   Musculoskeletal:         General: No tenderness. Normal range of motion.      Cervical back: Normal range of motion.      Right lower leg: Edema present.      Left lower leg: Edema present.   Skin:     Capillary Refill: Capillary refill takes less than 2 seconds.   Neurological:      General: No focal deficit present.      Mental Status: She is alert and oriented to person, " place, and time. Mental status is at baseline.   Psychiatric:         Mood and Affect: Mood normal.         Behavior: Behavior normal.         Thought Content: Thought content normal.          LABS REVIEWED:  Lab Results   Component Value Date    WBC 5.29 08/03/2023    RBC 3.56 (L) 08/03/2023    HGB 12.5 08/03/2023    HCT 37.3 (L) 08/03/2023    .8 (H) 08/03/2023    MCH 35.1 (H) 08/03/2023    MCHC 33.5 08/03/2023    RDW 14.1 08/03/2023     08/03/2023    MPV 10.9 08/03/2023    NRBC 0.0 08/03/2023     Lab Results   Component Value Date     09/13/2023    K 3.9 09/13/2023     (H) 09/13/2023    CO2 30 09/13/2023    BUN 14 09/13/2023     Lab Results   Component Value Date    AST 50 (H) 07/31/2023    ALT 53 07/31/2023     Lab Results   Component Value Date     (H) 09/13/2023     Lab Results   Component Value Date    CHOL 174 08/02/2023    HDL 46 08/02/2023    TRIG 189 (H) 08/02/2023    CHOLHDL 3.8 08/02/2023       CARDIAC STUDIES REVIEWED:    OTHER IMAGING STUDIES REVIEWED:  Results for orders placed during the hospital encounter of 07/31/23    Echo    Interpretation Summary    Left Ventricle: The left ventricle is normal in size. Increased wall thickness. There is concentric remodeling. Normal wall motion. There is normal systolic function with a visually estimated ejection fraction of 60 - 65%. Grade I diastolic dysfunction.    Left Atrium: Normal left atrial size.    Right Ventricle: Normal right ventricular cavity size. Systolic function is normal.    Aortic Valve: The aortic valve is a trileaflet valve. There is mild aortic regurgitation with a centrally directed jet.    Mitral Valve: There is no stenosis. The mean pressure gradient across the mitral valve is 3 mmHg at a heart rate of  bpm. There is mild regurgitation.    Tricuspid Valve: There is mild transvalvular regurgitation with a centrally directed jet.    Pulmonic Valve: There is no significant stenosis.       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 Bilateral lower extremity edema,  will DC amlodipine, increase to Cozaar to 50 mg q d.   2. Palpitations, rare PVCs, PACs on Zio3., now asymptomatic continue on low dose beta blocker.    3. Hyperlipiemia: controled on Statin  4. Hypertension, not well controlled, will increase Cozaar to 100 mg q d.   5. Diastolic heart failure, not fully compensated on current meds, improved but did not resolve off amolodipine, will monitor on increased Cozzaar.       Reevaluate in 4 to 6 weeks.

## 2024-03-21 ENCOUNTER — OFFICE VISIT (OUTPATIENT)
Dept: CARDIOLOGY | Facility: CLINIC | Age: 59
End: 2024-03-21
Payer: MEDICARE

## 2024-03-21 VITALS
HEIGHT: 62 IN | DIASTOLIC BLOOD PRESSURE: 80 MMHG | HEART RATE: 79 BPM | OXYGEN SATURATION: 99 % | SYSTOLIC BLOOD PRESSURE: 130 MMHG | BODY MASS INDEX: 42.43 KG/M2

## 2024-03-21 DIAGNOSIS — R00.2 INTERMITTENT PALPITATIONS: ICD-10-CM

## 2024-03-21 DIAGNOSIS — R00.2 PALPITATIONS: ICD-10-CM

## 2024-03-21 DIAGNOSIS — I11.0 HYPERTENSIVE HEART DISEASE WITH CHRONIC DIASTOLIC CONGESTIVE HEART FAILURE: ICD-10-CM

## 2024-03-21 DIAGNOSIS — M79.89 LOCALIZED SWELLING OF BOTH LOWER EXTREMITIES: ICD-10-CM

## 2024-03-21 DIAGNOSIS — I50.32 HYPERTENSIVE HEART DISEASE WITH CHRONIC DIASTOLIC CONGESTIVE HEART FAILURE: ICD-10-CM

## 2024-03-21 DIAGNOSIS — I10 ESSENTIAL HYPERTENSION: Primary | ICD-10-CM

## 2024-03-21 DIAGNOSIS — E78.2 MIXED HYPERLIPIDEMIA: ICD-10-CM

## 2024-03-21 DIAGNOSIS — I50.41 ACUTE COMBINED SYSTOLIC AND DIASTOLIC CONGESTIVE HEART FAILURE: ICD-10-CM

## 2024-03-21 PROCEDURE — 3008F BODY MASS INDEX DOCD: CPT | Mod: CPTII,,, | Performed by: INTERNAL MEDICINE

## 2024-03-21 PROCEDURE — 4010F ACE/ARB THERAPY RXD/TAKEN: CPT | Mod: CPTII,,, | Performed by: INTERNAL MEDICINE

## 2024-03-21 PROCEDURE — 1159F MED LIST DOCD IN RCRD: CPT | Mod: CPTII,,, | Performed by: INTERNAL MEDICINE

## 2024-03-21 PROCEDURE — 3079F DIAST BP 80-89 MM HG: CPT | Mod: CPTII,,, | Performed by: INTERNAL MEDICINE

## 2024-03-21 PROCEDURE — 99213 OFFICE O/P EST LOW 20 MIN: CPT | Mod: PBBFAC | Performed by: INTERNAL MEDICINE

## 2024-03-21 PROCEDURE — 99214 OFFICE O/P EST MOD 30 MIN: CPT | Mod: S$PBB,,, | Performed by: INTERNAL MEDICINE

## 2024-03-21 PROCEDURE — 3075F SYST BP GE 130 - 139MM HG: CPT | Mod: CPTII,,, | Performed by: INTERNAL MEDICINE

## 2024-03-21 RX ORDER — ROSUVASTATIN CALCIUM 20 MG/1
20 TABLET, COATED ORAL DAILY
COMMUNITY
Start: 2024-03-11

## 2024-03-21 NOTE — PROGRESS NOTES
"Cardiology Clinic Note:    PCP: No, Primary Doctor    REFERRING PHYSICIAN:     CHIEF COMPLAINT:     HISTORY OF PRESENT ILLNESS:  Kerri Morrow is a 58 y.o. female with PMH of HTN, HLD, and Stroke. She presented for  follow up and evaluation of palpitations and SOB, notes lower extremity edema and shortness of breath have resolved with change in meds off amlodipine, on increased losartin. . Patient reported palpitations have resolved. She denies  chest pain, SOB, dizziness, lightheadedness, flushing, or nausea.  [place] swelling in her left leg has resolved.       Review of Systems:  Review of Systems   Constitutional: Negative for decreased appetite, fever, malaise/fatigue, weight gain and weight loss.   HENT: Negative.     Eyes:  Negative for blurred vision and visual disturbance.   Cardiovascular:  Positive for dyspnea on exertion, leg swelling and palpitations. Negative for chest pain, irregular heartbeat, near-syncope and orthopnea.   Respiratory:  Positive for shortness of breath and snoring. Negative for wheezing.    Endocrine: Negative.    Hematologic/Lymphatic: Negative.    Musculoskeletal:  Negative for falls.          PAST MEDICAL HISTORY:  Past Medical History:   Diagnosis Date    Acid reflux     High cholesterol     Hypertension     Stroke        PAST SURGICAL HISTORY:  Past Surgical History:   Procedure Laterality Date    MOUTH SURGERY         SOCIAL HISTORY:  Social History     Socioeconomic History    Marital status:    Tobacco Use    Smoking status: Never    Smokeless tobacco: Current     Types: Chew, Snuff    Tobacco comments:     1 bag daily   Substance and Sexual Activity    Alcohol use: Not Currently     Comment: beer "almost every day", states 2-3 quarts of beer a day but states "has slacked up now"    Drug use: Never     Social Determinants of Health     Financial Resource Strain: Low Risk  (8/1/2023)    Overall Financial Resource Strain (CARDIA)     Difficulty of Paying Living " Expenses: Not hard at all   Food Insecurity: No Food Insecurity (8/1/2023)    Hunger Vital Sign     Worried About Running Out of Food in the Last Year: Never true     Ran Out of Food in the Last Year: Never true   Transportation Needs: No Transportation Needs (8/1/2023)    PRAPARE - Transportation     Lack of Transportation (Medical): No     Lack of Transportation (Non-Medical): No   Physical Activity: Inactive (8/1/2023)    Exercise Vital Sign     Days of Exercise per Week: 0 days     Minutes of Exercise per Session: 0 min   Stress: No Stress Concern Present (8/1/2023)    [employer] - Occupational Stress Questionnaire     Feeling of Stress : Not at all   Social Connections: Socially Integrated (8/1/2023)    Social Connection and Isolation Panel [NHANES]     Frequency of Communication with Friends and Family: More than three times a week     Frequency of Social Gatherings with Friends and Family: More than three times a week     Attends [employer] Services: More than 4 times per year     Active Member of Clubs or Organizations: Yes     Attends Club or Organization Meetings: More than 4 times per year     Marital Status:    Housing Stability: Low Risk  (8/1/2023)    Housing Stability Vital Sign     Unable to Pay for Housing in the Last Year: No     Number of Places Lived in the Last Year: 1     Unstable Housing in the Last Year: No       FAMILY HISTORY:  No family history on file.    ALLERGIES:  Allergies as of 03/21/2024 - Reviewed 03/21/2024   Allergen Reaction Noted    Pcn [penicillins] Rash 10/17/2022    Codeine Other (See Comments) 02/07/2024    Iodinated contrast media Itching 09/13/2023         MEDICATIONS:  Current Outpatient Medications on File Prior to Visit   Medication Sig Dispense Refill    aspirin 81 MG Chew Take 81 mg by mouth once daily.      furosemide (LASIX) 40 MG tablet Take 1 tablet (40 mg total) by mouth once daily. 30 tablet 0    losartan (COZAAR) 100 MG  "tablet Take 1 tablet (100 mg total) by mouth once daily. 90 tablet 3    pantoprazole (PROTONIX) 40 MG tablet Take 1 tablet (40 mg total) by mouth once daily. 30 tablet 2    rosuvastatin (CRESTOR) 20 MG tablet Take 20 mg by mouth once daily.      metoprolol succinate (TOPROL-XL) 25 MG 24 hr tablet Take 1 tablet (25 mg total) by mouth once daily. 30 tablet 6    rosuvastatin (CRESTOR) 10 MG tablet Take 10 mg by mouth once daily.       No current facility-administered medications on file prior to visit.          PHYSICAL EXAM:  Blood pressure 130/80, pulse 79, height 5' 2" (1.575 m), SpO2 99 %.  Wt Readings from Last 3 Encounters:   12/18/23 105.2 kg (232 lb)   11/07/23 105.5 kg (232 lb 9.6 oz)   09/13/23 107.5 kg (237 lb)      Body mass index is 42.43 kg/m².    Physical Exam  Vitals and nursing note reviewed.   Constitutional:       General: She is not in acute distress.     Appearance: Normal appearance. She is obese. She is not ill-appearing, toxic-appearing or diaphoretic.   HENT:      Right Ear: Tympanic membrane and ear canal normal.      Left Ear: Tympanic membrane and ear canal normal.      Nose: Nose normal. No congestion or rhinorrhea.      Mouth/Throat:      Mouth: Mucous membranes are moist.      Pharynx: Oropharynx is clear.   Eyes:      Extraocular Movements: Extraocular movements intact.      Conjunctiva/sclera: Conjunctivae normal.      Pupils: Pupils are equal, round, and reactive to light.   Cardiovascular:      Rate and Rhythm: Normal rate and regular rhythm.      Pulses: Normal pulses.      Heart sounds: Murmur heard.   Pulmonary:      Effort: Pulmonary effort is normal. No respiratory distress.      Breath sounds: Normal breath sounds. No wheezing.   Musculoskeletal:         General: No tenderness. Normal range of motion.      Cervical back: Normal range of motion.      Right lower leg: Edema present.      Left lower leg: Edema present.   Skin:     Capillary Refill: Capillary refill takes less than " 2 seconds.   Neurological:      General: No focal deficit present.      Mental Status: She is alert and oriented to person, place, and time. Mental status is at baseline.   Psychiatric:         Mood and Affect: Mood normal.         Behavior: Behavior normal.         Thought Content: Thought content normal.          LABS REVIEWED:  Lab Results   Component Value Date    WBC 5.29 08/03/2023    RBC 3.56 (L) 08/03/2023    HGB 12.5 08/03/2023    HCT 37.3 (L) 08/03/2023    .8 (H) 08/03/2023    MCH 35.1 (H) 08/03/2023    MCHC 33.5 08/03/2023    RDW 14.1 08/03/2023     08/03/2023    MPV 10.9 08/03/2023    NRBC 0.0 08/03/2023     Lab Results   Component Value Date     09/13/2023    K 3.9 09/13/2023     (H) 09/13/2023    CO2 30 09/13/2023    BUN 14 09/13/2023     Lab Results   Component Value Date    AST 50 (H) 07/31/2023    ALT 53 07/31/2023     Lab Results   Component Value Date     (H) 09/13/2023     Lab Results   Component Value Date    CHOL 174 08/02/2023    HDL 46 08/02/2023    TRIG 189 (H) 08/02/2023    CHOLHDL 3.8 08/02/2023       CARDIAC STUDIES REVIEWED:    OTHER IMAGING STUDIES REVIEWED:  Results for orders placed during the hospital encounter of 07/31/23    Echo    Interpretation Summary    Left Ventricle: The left ventricle is normal in size. Increased wall thickness. There is concentric remodeling. Normal wall motion. There is normal systolic function with a visually estimated ejection fraction of 60 - 65%. Grade I diastolic dysfunction.    Left Atrium: Normal left atrial size.    Right Ventricle: Normal right ventricular cavity size. Systolic function is normal.    Aortic Valve: The aortic valve is a trileaflet valve. There is mild aortic regurgitation with a centrally directed jet.    Mitral Valve: There is no stenosis. The mean pressure gradient across the mitral valve is 3 mmHg at a heart rate of  bpm. There is mild regurgitation.    Tricuspid Valve: There is mild  transvalvular regurgitation with a centrally directed jet.    Pulmonic Valve: There is no significant stenosis.       ASSESSMENT:   Essential hypertension    Acute combined systolic and diastolic congestive heart failure    Palpitations          PLAN:  1. Bilateral lower extremity edema, has resolved off amlodipine, on  increases  Cozaar to 50 mg q d.   2. Palpitations, rare PVCs, PACs on Zio3., now asymptomatic continue on low dose beta blocker.    3. Hyperlipiemia: controled on Statin  4. Hypertension, not well controlled, will increase Cozaar to 100 mg q d.   5. Diastolic heart failure,much better  compensated on current meds,       Reevaluate in 6 months.

## 2024-08-20 DIAGNOSIS — M25.552 PAIN OF LEFT HIP JOINT: Primary | ICD-10-CM

## 2024-08-26 ENCOUNTER — CLINICAL SUPPORT (OUTPATIENT)
Dept: REHABILITATION | Facility: HOSPITAL | Age: 59
End: 2024-08-26
Payer: MEDICARE

## 2024-08-26 DIAGNOSIS — M25.552 PAIN OF LEFT HIP JOINT: Primary | ICD-10-CM

## 2024-08-26 PROCEDURE — 97162 PT EVAL MOD COMPLEX 30 MIN: CPT

## 2024-08-26 NOTE — PLAN OF CARE
[facility]   Physical Therapy Initial Evaluation      Name: Kerri Morrow  Clinic Number: [id number]    Therapy Diagnosis:   Encounter Diagnosis   Name Primary?    Pain of left hip joint Yes        Physician: Allie Pugh D*    Physician Orders: PT Eval and Treat   Medical Diagnosis from Referral: Pain of left hip joint  Evaluation Date: 8/26/2024  Authorization Period Expiration: none  Plan of Care Expiration: 10/4/24  Progress Note Due: 10/4/24  Visit # / Visits authorized: 1/ 10   FOTO: 44/100    Precautions: Standard     Time In: 1045  Time Out: 1013  Total Appointment Time (timed & untimed codes): 28 minutes    Subjective     Date of onset: 2 weeks ago    History of current condition - Kerri reports: she has been having left hip pain for a couple of weeks. She denies any injury. The pain is on her hemiparesis side. She had a CVA in 2012 and has had chronic weakness of the left LE ever since. She reports she walks a little at home with a quad cane but for most of the day she is sitting in her wheelchair. She has had therapy in the past for her left hemiparesis and she was able to progress to approximately 170 feet ambulation with QC. Her activity level has decreased since we saw her last in therapy.    Falls: no    Imaging: none:     Prior Therapy: yes  Social History:  lives with their family  Occupation: none  Prior Level of Function: Assisted with ADL's and mobility  Current Level of Function: Assisted with ADL's and mobility    Pain:  Current 0/10, worst 4/10, best 0/10   Location: left hip    Description: Aching  Aggravating Factors: Standing  Easing Factors: rest    Patients goals: to walk a little better than I do     Medical History:   Past Medical History:   Diagnosis Date    Acid reflux     High cholesterol     Hypertension     Stroke        Surgical History:   Kerri Morrow  has a past surgical history that includes Mouth surgery.    Medications:    Kerri has a current medication list which includes the following prescription(s): aspirin, furosemide, losartan, metoprolol succinate, pantoprazole, rosuvastatin, and rosuvastatin.    Allergies:   Review of patient's allergies indicates:   Allergen Reactions    Pcn [penicillins] Rash    Codeine Other (See Comments)    Iodinated contrast media Itching        Objective          Range of motion: AROM  Motion Right Left    Hip flexion  WITHIN FUNCTIONAL LIMITS   45   Hip extension  WITHIN FUNCTIONAL LIMITS   0   Hip abduction  WITHIN FUNCTIONAL LIMITS   9   Hip adduction  WITHIN FUNCTIONAL LIMITS   15   Internal rotation  WITHIN FUNCTIONAL LIMITS   5   External rotation  WITHIN FUNCTIONAL LIMITS   0   Knee extension  WITHIN FUNCTIONAL LIMITS  WITHIN FUNCTIONAL LIMITS   Knee flexion  WITHIN FUNCTIONAL LIMITS   60   Ankle DF  WITHIN FUNCTIONAL LIMITS   -28   Ankle PF  WITHIN FUNCTIONAL LIMITS   50   Ankle Inversion  WITHIN FUNCTIONAL LIMITS   3   Ankle Eversion  WITHIN FUNCTIONAL LIMITS   0       Manual muscle test   Muscle Right  Left    Hip flexion  MMT strength: 3+/5  MMT strength: 2+/5   Hip extension  MMT strength: 3-/5  MMT strength: 2-/5   Hip abduction  MMT strength: 3+/5  MMT strength: 2+/5   Hip adduction  MMT strength: 5/5  MMT strength: 2+/5   Hip internal rotation  MMT strength: 3+/5  MMT strength: 2/5   Hip external rotation  MMT strength: 3+/5  MMT strength: 2-/5   Knee extension  MMT strength: 5/5  MMT strength: 4/5   Knee flexion  MMT strength: 5/5  MMT strength: 2+/5   Ankle DF  MMT strength: 5/5  MMT strength: 2/5   Ankle PF  MMT strength: 3+/5  MMT strength: 2/5   Ankle inversion  MMT strength: 3+/5  MMT strength: 2-/5   Ankle eversion  MMT strength: 3+/5  MMT strength: 0/5       Gait:  Weight bearing precautions: WBAT  Assistive device: straight cane  Ambulation distance and deviations: 25 feet with SPC CGA; step to gait; decreased left swing phase and heel strike; left foot  drop  Stairs:  Comments:    Transfers:  Sit to stand: [facility]  Wheelchair to mat: [facility]  Sit to supine; Min assist  Supine to sit: min assist      Clinical Special Tests:  A. Hip    1. Hip scour test:  left Negative  4. Piriformis test: left Negative  5. Jared's test:  left Negative    Limitation/Restriction for FOTO hip Survey    Therapist reviewed FOTO scores for Kerri Morrow on 8/26/2024.   FOTO documents entered into [employer] - see Media section.    Limitation Score: 44%           Patient Education and Home Exercises     Education provided:   - evaluation results discussed with patient.    Written Home Exercises Provided:  to be completed next visit .     Assessment     Kerri is a 59 y.o. female referred to outpatient Physical Therapy with a medical diagnosis of left hip pain. Patient presents with intermittent pain, residual weakness from CVA in 2012, gait deficits from prolonged hemiparesis; decreased left hip joint ROM.    Patient prognosis is Fair.   Patient will benefit from skilled outpatient Physical Therapy to address the deficits stated above and in the chart below, provide patient /family education, and to maximize patientt's level of independence.     Plan of care discussed with patient: Yes  Patient's spiritual, cultural and educational needs considered and patient is agreeable to the plan of care and goals as stated below:     Anticipated Barriers for therapy: Old CVA with chronic hemiparesis    Medical Necessity is demonstrated by the following  History  Co-morbidities and personal factors that may impact the plan of care [] LOW: no personal factors / co-morbidities  [x] MODERATE: 1-2 personal factors / co-morbidities  [] HIGH: 3+ personal factors / co-morbidities    Moderate / High Support Documentation:   Co-morbidities affecting plan of care:   Past Medical History:   Diagnosis Date    Acid reflux     High cholesterol     Hypertension     Stroke        Personal Factors:   lifestyle      Examination  Body Structures and Functions, activity limitations and participation restrictions that may impact the plan of care [] LOW: addressing 1-2 elements  [x] MODERATE: 3+ elements  [] HIGH: 4+ elements (please support below)    Moderate / High Support Documentation: strength, ROM, gait, transfers.     Clinical Presentation [] LOW: stable  [x] MODERATE: Evolving  [] HIGH: Unstable     Decision Making/ Complexity Score: moderate       Goals:  Short Term Goals: 3 weeks   Patient will increase left hip flexion to 10 degrees to allow patient to perform activities of daily living without limitations.  Patient will report decrease in pain of left hip to 4/10 with all activities to improve quality of life  Patient will ambulate 75 feet with appropriate assistive device SBA to improve gait independence.  Patient will be independent with home exercise program.      Long Term Goals: 5 weeks   Patient will tolerate 25 minutes or greater of exercise/activity with left hip pain 2/10 or less.  Patient will ambulate 150 or greater with cane/quad cane SBA to improve home mobility independence.  Plan     Plan of care Certification: 8/26/2024 to 10/4/24.    Outpatient Physical Therapy 2 times weekly for 5 weeks to include the following interventions: Electrical Stimulation IFC, Gait Training, Moist Heat/ Ice, Neuromuscular Re-ed, Patient Education, Therapeutic Activities, Therapeutic Exercise, and Ultrasound.     Jeremiah Robles, PT    I CERTIFY THE NEED FOR THESE SERVICES FURNISHED UNDER THIS PLAN OF TREATMENT AND WHILE UNDER MY CARE   Physician's comments:     Physician's Signature: ___________________________________________________

## 2024-08-28 ENCOUNTER — CLINICAL SUPPORT (OUTPATIENT)
Dept: REHABILITATION | Facility: HOSPITAL | Age: 59
End: 2024-08-28
Payer: MEDICARE

## 2024-08-28 DIAGNOSIS — M25.552 PAIN OF LEFT HIP JOINT: Primary | ICD-10-CM

## 2024-08-28 PROCEDURE — 97110 THERAPEUTIC EXERCISES: CPT | Mod: CQ

## 2024-08-28 NOTE — PROGRESS NOTES
[facility]   Physical Therapy Treatment Note      Name: Kerri Morrow  Clinic Number: [id number]    Therapy Diagnosis:   Encounter Diagnosis   Name Primary?    Pain of left hip joint Yes     Physician: Allie Pugh D*    Visit Date: 8/28/2024    Physician Orders: PT Eval and Treat   Medical Diagnosis from Referral: Pain of left hip joint  Evaluation Date: 8/26/2024  Authorization Period Expiration: none  Plan of Care Expiration: 10/4/24  Progress Note Due: 10/4/24  Visit # / Visits authorized: 1/ 10   FOTO: 44/100    PTA Visit #: 1/5     Time In: 933  Time Out: 1011  Total Billable Time: 41 minutes    Subjective     Pt reports: no pain at this time.  She  will be  compliant with home exercise program.  Response to previous treatment: evaluation  Functional change: none    Pain: 0/10  Location:      Objective      Range of motion: AROM  Motion Right Left    Hip flexion  WITHIN FUNCTIONAL LIMITS   45   Hip extension  WITHIN FUNCTIONAL LIMITS   0   Hip abduction  WITHIN FUNCTIONAL LIMITS   9   Hip adduction  WITHIN FUNCTIONAL LIMITS   15   Internal rotation  WITHIN FUNCTIONAL LIMITS   5   External rotation  WITHIN FUNCTIONAL LIMITS   0   Knee extension  WITHIN FUNCTIONAL LIMITS  WITHIN FUNCTIONAL LIMITS   Knee flexion  WITHIN FUNCTIONAL LIMITS   60   Ankle DF  WITHIN FUNCTIONAL LIMITS   -28   Ankle PF  WITHIN FUNCTIONAL LIMITS   50   Ankle Inversion  WITHIN FUNCTIONAL LIMITS   3   Ankle Eversion  WITHIN FUNCTIONAL LIMITS   0         Manual muscle test   Muscle Right  Left    Hip flexion  MMT strength: 3+/5  MMT strength: 2+/5   Hip extension  MMT strength: 3-/5  MMT strength: 2-/5   Hip abduction  MMT strength: 3+/5  MMT strength: 2+/5   Hip adduction  MMT strength: 5/5  MMT strength: 2+/5   Hip internal rotation  MMT strength: 3+/5  MMT strength: 2/5   Hip external rotation  MMT strength: 3+/5  MMT strength: 2-/5   Knee extension  MMT strength: 5/5  MMT strength: 4/5   Knee  flexion  MMT strength: 5/5  MMT strength: 2+/5   Ankle DF  MMT strength: 5/5  MMT strength: 2/5   Ankle PF  MMT strength: 3+/5  MMT strength: 2/5   Ankle inversion  MMT strength: 3+/5  MMT strength: 2-/5   Ankle eversion  MMT strength: 3+/5  MMT strength: 0/5        Treatment     Kerri received the treatments listed below:      therapeutic exercises to develop strength, endurance, ROM, and core stabilization for 41 minutes including:  Scifit bike lvl 2.0 x 5 minutes   Seated LAQ 2 x 10 1.5#  Seated hip adduction with ball 2 x 20   bridges 2 x 10   SLR, Hip abduction 2 x 10   Standing hip flexion, hip abduction, hip extension 2 x 10   Squats 2 x 10     Patient Education and Home Exercises       Education provided:   -  received verbal and tactile cues to facilitate proper execution of exercises and body mechanics.    Written Home Exercises Provided:  next visit . Exercises were reviewed and Kerri was able to demonstrate them prior to the end of the session.  Kerri demonstrated good  understanding of the education provided. See EMR under Patient Instructions for exercises provided during therapy sessions    Assessment   Kerri presents today with no hip pain. She tolerated treatment well with no complaints or difficulty. She ambulated about 10 feet from bike to mat with CGA and quad cane. L ankle rolls inward when ambulating. Patient does have a ankle brace/orthotic but says she needs to find it.     Kerri is a 59 y.o. female referred to outpatient Physical Therapy with a medical diagnosis of left hip pain. Patient presents with intermittent pain, residual weakness from CVA in 2012, gait deficits from prolonged hemiparesis; decreased left hip joint ROM.     Kerri Is progressing well towards her goals.   Pt prognosis is Good.     Pt will continue to benefit from skilled outpatient physical therapy to address the deficits listed in the problem list box on initial evaluation, provide pt/family education  and to maximize pt's level of independence in the home and community environment.     Pt's spiritual, cultural and educational needs considered and pt agreeable to plan of care and goals.     Anticipated barriers to physical therapy: none    Goals: Short Term Goals: 3 weeks   Patient will increase left hip flexion to 10 degrees to allow patient to perform activities of daily living without limitations.  Patient will report decrease in pain of left hip to 4/10 with all activities to improve quality of life  Patient will ambulate 75 feet with appropriate assistive device SBA to improve gait independence.  Patient will be independent with home exercise program.        Long Term Goals: 5 weeks   Patient will tolerate 25 minutes or greater of exercise/activity with left hip pain 2/10 or less.  Patient will ambulate 150 or greater with cane/quad cane SBA to improve home mobility independence.    Plan     Plan of care Certification: 8/26/2024 to 10/4/24.     Outpatient Physical Therapy 2 times weekly for 5 weeks to include the following interventions: Electrical Stimulation IFC, Gait Training, Moist Heat/ Ice, Neuromuscular Re-ed, Patient Education, Therapeutic Activities, Therapeutic Exercise, and Ultrasound.     Dominguez Rhodes, PTA

## 2024-09-04 ENCOUNTER — CLINICAL SUPPORT (OUTPATIENT)
Dept: REHABILITATION | Facility: HOSPITAL | Age: 59
End: 2024-09-04
Payer: MEDICARE

## 2024-09-04 DIAGNOSIS — M25.552 PAIN OF LEFT HIP JOINT: Primary | ICD-10-CM

## 2024-09-04 PROCEDURE — 97110 THERAPEUTIC EXERCISES: CPT | Mod: CQ

## 2024-09-04 NOTE — PROGRESS NOTES
[facility]   Physical Therapy Treatment Note      Name: Kerri Morrow  Clinic Number: [id number]    Therapy Diagnosis:   Encounter Diagnosis   Name Primary?    Pain of left hip joint Yes     Physician: Allie Pugh D*    Visit Date: 9/4/2024    Physician Orders: PT Eval and Treat   Medical Diagnosis from Referral: Pain of left hip joint  Evaluation Date: 8/26/2024  Authorization Period Expiration: none  Plan of Care Expiration: 10/4/24  Progress Note Due: 10/4/24  Visit # / Visits authorized: 3/10   FOTO: 44/100    PTA Visit #: 2/5     Time In: 1019  Time Out: 1054  Total Billable Time: 35 minutes    Subjective     Pt reports: no pain at this time.  She  will be  compliant with home exercise program.  Response to previous treatment: evaluation  Functional change: none    Pain: 0/10  Location:      Objective      Range of motion: AROM  Motion Right Left    Hip flexion  WITHIN FUNCTIONAL LIMITS   45   Hip extension  WITHIN FUNCTIONAL LIMITS   0   Hip abduction  WITHIN FUNCTIONAL LIMITS   9   Hip adduction  WITHIN FUNCTIONAL LIMITS   15   Internal rotation  WITHIN FUNCTIONAL LIMITS   5   External rotation  WITHIN FUNCTIONAL LIMITS   0   Knee extension  WITHIN FUNCTIONAL LIMITS  WITHIN FUNCTIONAL LIMITS   Knee flexion  WITHIN FUNCTIONAL LIMITS   60   Ankle DF  WITHIN FUNCTIONAL LIMITS   -28   Ankle PF  WITHIN FUNCTIONAL LIMITS   50   Ankle Inversion  WITHIN FUNCTIONAL LIMITS   3   Ankle Eversion  WITHIN FUNCTIONAL LIMITS   0         Manual muscle test   Muscle Right  Left    Hip flexion  MMT strength: 3+/5  MMT strength: 2+/5   Hip extension  MMT strength: 3-/5  MMT strength: 2-/5   Hip abduction  MMT strength: 3+/5  MMT strength: 2+/5   Hip adduction  MMT strength: 5/5  MMT strength: 2+/5   Hip internal rotation  MMT strength: 3+/5  MMT strength: 2/5   Hip external rotation  MMT strength: 3+/5  MMT strength: 2-/5   Knee extension  MMT strength: 5/5  MMT strength: 4/5   Knee  flexion  MMT strength: 5/5  MMT strength: 2+/5   Ankle DF  MMT strength: 5/5  MMT strength: 2/5   Ankle PF  MMT strength: 3+/5  MMT strength: 2/5   Ankle inversion  MMT strength: 3+/5  MMT strength: 2-/5   Ankle eversion  MMT strength: 3+/5  MMT strength: 0/5        Treatment     Kerri received the treatments listed below:      therapeutic exercises to develop strength, endurance, ROM, and core stabilization for 35 minutes including:  Scifit bike lvl 2.0 x 5 minutes - not today  Seated LAQ and hip flexion 2 x 15 1.5#  Seated hip adduction with ball 2 x 20   Bridges 2 x 10   SLR, Hip abduction 2 x 10   L Standing SLR, hip abduction, hip extension 2 x 10   Squats 2 x 10     ++ Begin Gait Training next session++  Patient Education and Home Exercises       Education provided:   -  received verbal and tactile cues to facilitate proper execution of exercises and body mechanics.    Written Home Exercises Provided: Patient instructed to cont prior HEP. Exercises were reviewed and Kerri was able to demonstrate them prior to the end of the session.  Kerri demonstrated good  understanding of the education provided. See EMR under Patient Instructions for exercises provided during therapy sessions    Assessment   Kerri presents today with no hip pain and no ankle brace. She states she does not usually have hip pain until it rains. Patient,PTA, and PT talked about possibly having patient set an appointment for ankle brace/orthotic fitting for new brace.     Kerri is a 59 y.o. female referred to outpatient Physical Therapy with a medical diagnosis of left hip pain. Patient presents with intermittent pain, residual weakness from CVA in 2012, gait deficits from prolonged hemiparesis; decreased left hip joint ROM.     Kerri Is progressing well towards her goals.   Pt prognosis is Good.     Pt will continue to benefit from skilled outpatient physical therapy to address the deficits listed in the problem list box on  initial evaluation, provide pt/family education and to maximize pt's level of independence in the home and community environment.     Pt's spiritual, cultural and educational needs considered and pt agreeable to plan of care and goals.     Anticipated barriers to physical therapy: none    Goals: Short Term Goals: 3 weeks   Patient will increase left hip flexion to 10 degrees to allow patient to perform activities of daily living without limitations.  Patient will report decrease in pain of left hip to 4/10 with all activities to improve quality of life  Patient will ambulate 75 feet with appropriate assistive device SBA to improve gait independence.  Patient will be independent with home exercise program.        Long Term Goals: 5 weeks   Patient will tolerate 25 minutes or greater of exercise/activity with left hip pain 2/10 or less.  Patient will ambulate 150 or greater with cane/quad cane SBA to improve home mobility independence.    Plan     Plan of care Certification: 8/26/2024 to 10/4/24.     Outpatient Physical Therapy 2 times weekly for 5 weeks to include the following interventions: Electrical Stimulation IFC, Gait Training, Moist Heat/ Ice, Neuromuscular Re-ed, Patient Education, Therapeutic Activities, Therapeutic Exercise, and Ultrasound.     Dominguez Rhodes, PTA

## 2024-09-09 ENCOUNTER — CLINICAL SUPPORT (OUTPATIENT)
Dept: REHABILITATION | Facility: HOSPITAL | Age: 59
End: 2024-09-09
Payer: MEDICARE

## 2024-09-09 DIAGNOSIS — M25.552 PAIN OF LEFT HIP JOINT: Primary | ICD-10-CM

## 2024-09-09 PROCEDURE — 97116 GAIT TRAINING THERAPY: CPT | Mod: CQ

## 2024-09-09 PROCEDURE — 97110 THERAPEUTIC EXERCISES: CPT | Mod: CQ

## 2024-09-09 NOTE — PROGRESS NOTES
[facility]   Physical Therapy Treatment Note      Name: Kerri Morrow  Clinic Number: [id number]    Therapy Diagnosis:   Encounter Diagnosis   Name Primary?    Pain of left hip joint Yes     Physician: Allie Pugh D*    Visit Date: 9/9/2024    Physician Orders: PT Eval and Treat   Medical Diagnosis from Referral: Pain of left hip joint  Evaluation Date: 8/26/2024  Authorization Period Expiration: none  Plan of Care Expiration: 10/4/24  Progress Note Due: 10/4/24  Visit # / Visits authorized: 4/10   FOTO: 44/100    PTA Visit #: 3/5     Time In: 1007  Time Out: 1046  Total Billable Time: 39 minutes    Subjective     Pt reports: no pain at this time.  She  will be  compliant with home exercise program.  Response to previous treatment: evaluation  Functional change: none    Pain: 0/10  Location:      Objective      Range of motion: AROM  Motion Right Left    Hip flexion  WITHIN FUNCTIONAL LIMITS   45   Hip extension  WITHIN FUNCTIONAL LIMITS   0   Hip abduction  WITHIN FUNCTIONAL LIMITS   9   Hip adduction  WITHIN FUNCTIONAL LIMITS   15   Internal rotation  WITHIN FUNCTIONAL LIMITS   5   External rotation  WITHIN FUNCTIONAL LIMITS   0   Knee extension  WITHIN FUNCTIONAL LIMITS  WITHIN FUNCTIONAL LIMITS   Knee flexion  WITHIN FUNCTIONAL LIMITS   60   Ankle DF  WITHIN FUNCTIONAL LIMITS   -28   Ankle PF  WITHIN FUNCTIONAL LIMITS   50   Ankle Inversion  WITHIN FUNCTIONAL LIMITS   3   Ankle Eversion  WITHIN FUNCTIONAL LIMITS   0         Manual muscle test   Muscle Right  Left    Hip flexion  MMT strength: 3+/5  MMT strength: 2+/5   Hip extension  MMT strength: 3-/5  MMT strength: 2-/5   Hip abduction  MMT strength: 3+/5  MMT strength: 2+/5   Hip adduction  MMT strength: 5/5  MMT strength: 2+/5   Hip internal rotation  MMT strength: 3+/5  MMT strength: 2/5   Hip external rotation  MMT strength: 3+/5  MMT strength: 2-/5   Knee extension  MMT strength: 5/5  MMT strength: 4/5   Knee  flexion  MMT strength: 5/5  MMT strength: 2+/5   Ankle DF  MMT strength: 5/5  MMT strength: 2/5   Ankle PF  MMT strength: 3+/5  MMT strength: 2/5   Ankle inversion  MMT strength: 3+/5  MMT strength: 2-/5   Ankle eversion  MMT strength: 3+/5  MMT strength: 0/5        Treatment     Kerri received the treatments listed below:      therapeutic exercises to develop strength, endurance, ROM, and core stabilization for 29 minutes including:  Seated LAQ and hip flexion 2 x 15 1.5#  Seated hip adduction with ball 2 x 20   L Standing SLR, hip abduction, hip extension 2 x 10 with 2# ankle weight   Squats 2 x 10     Not performed today:  Scifit bike lvl 2.0 x 5 minutes  Bridges 2 x 10   Supine SLR,  Hip abduction 2 x 10     gait training to improve functional mobility and safety for 10 minutes in hallway on flat surface. Patient ambulated 200 feet SBA with quad cane in hallway with one sitting rest break.     ++MEASUREMENTS NEXT VISIT++  Patient Education and Home Exercises       Education provided:   -  received verbal and tactile cues to facilitate proper execution of exercises and body mechanics.    Written Home Exercises Provided: Patient instructed to cont prior HEP. Exercises were reviewed and Kerri was able to demonstrate them prior to the end of the session.  Kerri demonstrated good  understanding of the education provided. See EMR under Patient Instructions for exercises provided during therapy sessions    Assessment   Kerri presents with no L hip pain at this time. She tolerated gait training well this morning with mild fatigue, but no pain. PT will continue to progress patient towards her short and long term goals. VC for 3 point gait with QC.    Kerri is a 59 y.o. female referred to outpatient Physical Therapy with a medical diagnosis of left hip pain. Patient presents with intermittent pain, residual weakness from CVA in 2012, gait deficits from prolonged hemiparesis; decreased left hip joint ROM.      Kerri Is progressing well towards her goals.   Pt prognosis is Good.     Pt will continue to benefit from skilled outpatient physical therapy to address the deficits listed in the problem list box on initial evaluation, provide pt/family education and to maximize pt's level of independence in the home and community environment.     Pt's spiritual, cultural and educational needs considered and pt agreeable to plan of care and goals.     Anticipated barriers to physical therapy: none    Goals: Short Term Goals: 3 weeks   Patient will increase left hip flexion to 10 degrees to allow patient to perform activities of daily living without limitations.  Patient will report decrease in pain of left hip to 4/10 with all activities to improve quality of life Met   Patient will ambulate 75 feet with appropriate assistive device SBA to improve gait independence. Met   Patient will be independent with home exercise program. Met         Long Term Goals: 5 weeks   Patient will tolerate 25 minutes or greater of exercise/activity with left hip pain 2/10 or less Met   Patient will ambulate 150 or greater with cane/quad cane SBA to improve home mobility independence.    Plan     Plan of care Certification: 8/26/2024 to 10/4/24.     Outpatient Physical Therapy 2 times weekly for 5 weeks to include the following interventions: Electrical Stimulation IFC, Gait Training, Moist Heat/ Ice, Neuromuscular Re-ed, Patient Education, Therapeutic Activities, Therapeutic Exercise, and Ultrasound.     Dominguez Rhodes, PTA

## 2024-09-11 ENCOUNTER — CLINICAL SUPPORT (OUTPATIENT)
Dept: REHABILITATION | Facility: HOSPITAL | Age: 59
End: 2024-09-11
Payer: MEDICARE

## 2024-09-11 DIAGNOSIS — M25.552 PAIN OF LEFT HIP JOINT: Primary | ICD-10-CM

## 2024-09-11 PROCEDURE — 97110 THERAPEUTIC EXERCISES: CPT

## 2024-09-11 PROCEDURE — 97116 GAIT TRAINING THERAPY: CPT

## 2024-09-11 NOTE — PROGRESS NOTES
[facility]   Physical Therapy Treatment Note      Name: Kerri Morrow  Clinic Number: [id number]    Therapy Diagnosis:   Encounter Diagnosis   Name Primary?    Pain of left hip joint Yes     Physician: Allie Pugh D*    Visit Date: 9/11/2024    Physician Orders: PT Eval and Treat   Medical Diagnosis from Referral: Pain of left hip joint  Evaluation Date: 8/26/2024  Authorization Period Expiration: none  Plan of Care Expiration: 10/4/24  Progress Note Due: 10/4/24  Visit # / Visits authorized: 5/10   FOTO: 44/100    PTA Visit #: 0/5     Time In: 943  Time Out: 1027  Total Billable Time: 44 minutes    Subjective     Pt reports: no pain at this time.  She  is partially  compliant with home exercise program.  Response to previous treatment: good  Functional change: none    Pain: 0/10  Location:  none    Objective      Range of motion: active range of motion 9/11/24  Motion Right Left    Hip flexion  WITHIN FUNCTIONAL LIMITS   60   Hip extension  WITHIN FUNCTIONAL LIMITS   0   Hip abduction  WITHIN FUNCTIONAL LIMITS   13   Hip adduction  WITHIN FUNCTIONAL LIMITS   15   Internal rotation  WITHIN FUNCTIONAL LIMITS   5   External rotation  WITHIN FUNCTIONAL LIMITS   0   Knee extension  WITHIN FUNCTIONAL LIMITS  WITHIN FUNCTIONAL LIMITS   Knee flexion  WITHIN FUNCTIONAL LIMITS   60   Ankle DF  WITHIN FUNCTIONAL LIMITS   -28   Ankle PF  WITHIN FUNCTIONAL LIMITS   50   Ankle Inversion  WITHIN FUNCTIONAL LIMITS   3   Ankle Eversion  WITHIN FUNCTIONAL LIMITS   0         Manual muscle test   Muscle Right  Left  9/11/24   Hip flexion  MMT strength: 3+/5  MMT strength: 2+/5   Hip extension  MMT strength: 3-/5  MMT strength: 2-/5   Hip abduction  MMT strength: 3+/5  MMT strength: 2+/5   Hip adduction  MMT strength: 5/5  MMT strength: 3-/5   Hip internal rotation  MMT strength: 3+/5  MMT strength: 2/5   Hip external rotation  MMT strength: 3+/5  MMT strength: 2-/5   Knee extension  MMT  strength: 5/5  MMT strength: 4+/5   Knee flexion  MMT strength: 5/5  MMT strength: 3-/5   Ankle DF  MMT strength: 5/5  MMT strength: 2/5   Ankle PF  MMT strength: 3+/5  MMT strength: 2/5   Ankle inversion  MMT strength: 3+/5  MMT strength: 2-/5   Ankle eversion  MMT strength: 3+/5  MMT strength: 0/5        Treatment     Kerri received the treatments listed below:      therapeutic exercises to develop strength, endurance, ROM, and core stabilization for 29 minutes including:  Scifit bike lvl 2.0 x 5 minutes  Seated LAQ 2# 2 x 15   hamstring curls green band 2 x 15  Supine hip flexion 2 x 10  Seated hip adduction with ball 2 x 20       Not performed today:  L Standing SLR, hip abduction, hip extension 2 x 10 with 2# ankle weight   Squats 2 x 10   Bridges 2 x 10   Supine SLR,  Hip abduction 2 x 10     gait training to improve functional mobility and safety for 15 minutes in hallway on flat surface.  Patient ambulated 90 feet and 240 feet SBA with quad cane in hallway with 3 standing rest breaks.   She demonstrates a step to gait due to her left hemiparesis but able to establish left foot clearance.    ++FOTO NEXT VISIT++  Patient Education and Home Exercises       Education provided:   -  received verbal and tactile cues to facilitate proper execution of exercises and body mechanics.    Written Home Exercises Provided: Patient instructed to cont prior HEP.     Assessment   Kerri presents with no pain today. She arrived to clinic ambulating from car to therapy gym using a QC supervision. She also demonstrated improved ambulation distance using her QC. She now has met her STG's and progressing well. She was encouraged to return to [facility] for re-fitting of her previous AFO.    Kerri is a 59 y.o. female referred to outpatient Physical Therapy with a medical diagnosis of left hip pain. Patient presents with intermittent pain, residual weakness from CVA in 2012, gait deficits from prolonged hemiparesis;  decreased left hip joint ROM.     Kerri Is progressing well towards her goals.   Pt prognosis is Good.     Pt will continue to benefit from skilled outpatient physical therapy to address the deficits listed in the problem list box on initial evaluation, provide pt/family education and to maximize pt's level of independence in the home and community environment.     Pt's spiritual, cultural and educational needs considered and pt agreeable to plan of care and goals.     Anticipated barriers to physical therapy: none    Goals: Short Term Goals: 3 weeks   Patient will increase left hip flexion to 10 degrees to allow patient to perform activities of daily living without limitations. Met   Patient will report decrease in pain of left hip to 4/10 with all activities to improve quality of life Met   Patient will ambulate 75 feet with appropriate assistive device SBA to improve gait independence. Met   Patient will be independent with home exercise program. Met         Long Term Goals: 5 weeks   Patient will tolerate 25 minutes or greater of exercise/activity with left hip pain 2/10 or less Met   Patient will ambulate 150 or greater with cane/quad cane SBA to improve home mobility independence.    Plan     Plan of care Certification: 8/26/2024 to 10/4/24.     Outpatient Physical Therapy 2 times weekly for 5 weeks to include the following interventions: Electrical Stimulation IFC, Gait Training, Moist Heat/ Ice, Neuromuscular Re-ed, Patient Education, Therapeutic Activities, Therapeutic Exercise, and Ultrasound.     Jeremiah Robles, PT

## 2024-09-20 ENCOUNTER — CLINICAL SUPPORT (OUTPATIENT)
Dept: REHABILITATION | Facility: HOSPITAL | Age: 59
End: 2024-09-20
Payer: MEDICARE

## 2024-09-20 DIAGNOSIS — M25.552 PAIN OF LEFT HIP JOINT: Primary | ICD-10-CM

## 2024-09-20 PROCEDURE — 97110 THERAPEUTIC EXERCISES: CPT

## 2024-09-20 PROCEDURE — 97116 GAIT TRAINING THERAPY: CPT

## 2024-09-20 NOTE — PROGRESS NOTES
[facility]   Physical Therapy Treatment Note      Name: Kerri Morrow  Clinic Number: [id number]    Therapy Diagnosis:   Encounter Diagnosis   Name Primary?    Pain of left hip joint Yes     Physician: Allie Pugh D*    Visit Date: 9/20/2024    Physician Orders: PT Eval and Treat   Medical Diagnosis from Referral: Pain of left hip joint  Evaluation Date: 8/26/2024  Authorization Period Expiration: none  Plan of Care Expiration: 10/4/24  Progress Note Due: 10/4/24  Visit # / Visits authorized: 6/10   FOTO: 44/100    PTA Visit #: 0/5     Time In: 916  Time Out: 1046  Total Billable Time: 30 minutes    Subjective     Pt reports: no pain at this time.  She  is partially  compliant with home exercise program.  Response to previous treatment: good  Functional change: none    Pain: 0/10  Location:  none    Objective      Range of motion: active range of motion 9/11/24  Motion Right Left    Hip flexion  WITHIN FUNCTIONAL LIMITS   60   Hip extension  WITHIN FUNCTIONAL LIMITS   0   Hip abduction  WITHIN FUNCTIONAL LIMITS   13   Hip adduction  WITHIN FUNCTIONAL LIMITS   15   Internal rotation  WITHIN FUNCTIONAL LIMITS   5   External rotation  WITHIN FUNCTIONAL LIMITS   0   Knee extension  WITHIN FUNCTIONAL LIMITS  WITHIN FUNCTIONAL LIMITS   Knee flexion  WITHIN FUNCTIONAL LIMITS   60   Ankle DF  WITHIN FUNCTIONAL LIMITS   -28   Ankle PF  WITHIN FUNCTIONAL LIMITS   50   Ankle Inversion  WITHIN FUNCTIONAL LIMITS   3   Ankle Eversion  WITHIN FUNCTIONAL LIMITS   0         Manual muscle test   Muscle Right  Left  9/20/24   Hip flexion  MMT strength: 3+/5  MMT strength: 2+/5   Hip extension  MMT strength: 3-/5  MMT strength: 2-/5   Hip abduction  MMT strength: 3+/5  MMT strength: 2+/5   Hip adduction  MMT strength: 5/5  MMT strength: 3-/5   Hip internal rotation  MMT strength: 3+/5  MMT strength: 2/5   Hip external rotation  MMT strength: 3+/5  MMT strength: 2-/5   Knee extension  MMT  strength: 5/5  MMT strength: 4+/5   Knee flexion  MMT strength: 5/5  MMT strength: 2+/5   Ankle DF  MMT strength: 5/5  MMT strength: 2/5   Ankle PF  MMT strength: 3+/5  MMT strength: 2/5   Ankle inversion  MMT strength: 3+/5  MMT strength: 2-/5   Ankle eversion  MMT strength: 3+/5  MMT strength: 0/5        Treatment     Kerri received the treatments listed below:      therapeutic exercises to develop strength, endurance, ROM, and core stabilization for 29 minutes including:  Scifit bike lvl 2.0 x 5 minutes  Seated LAQ 2# 2 x 15   Supine SLR 2 x 10  hamstring curls red band 2 x 15  Seated hip adduction with ball 2 x 20       gait training to improve functional mobility and safety for 10 minutes in hallway on flat surface.  Patient ambulated 180 feet Mod I with quad cane in hallway with 1 standing rest breaks.   She demonstrates a step to gait and foot drop due to her left hemiparesis but able to establish left foot clearance.      Patient Education and Home Exercises       Education provided:   -  patient encouraged to continue daily walking at home to maintain present level of functional mobility.    Written Home Exercises Provided: Patient instructed to cont prior HEP.     Assessment   Kerri presents with no pain today. She has now met all her goals and is ready to discharge from skilled therapy.    Kerri is a 59 y.o. female referred to outpatient Physical Therapy with a medical diagnosis of left hip pain. Patient presents with intermittent pain, residual weakness from CVA in 2012, gait deficits from prolonged hemiparesis; decreased left hip joint ROM.     Kerri Is progressing well towards her goals.   Pt prognosis is Good.       Anticipated barriers to physical therapy: none    Goals: Short Term Goals: 3 weeks   Patient will increase left hip flexion to 10 degrees to allow patient to perform activities of daily living without limitations. Met   Patient will report decrease in pain of left hip to 4/10  with all activities to improve quality of life Met   Patient will ambulate 75 feet with appropriate assistive device SBA to improve gait independence. Met   Patient will be independent with home exercise program. Met         Long Term Goals: 5 weeks   Patient will tolerate 25 minutes or greater of exercise/activity with left hip pain 2/10 or less Met   Patient will ambulate 150 or greater with cane/quad cane SBA to improve home mobility independence. Met    Plan     Discharge therapy services.    Jeremiah Robles, PT

## 2024-09-20 NOTE — PLAN OF CARE
[facility]  Physical Therapy Discharge Note    Name: Kerri Morrow  Clinic Number: [id number]    Therapy Diagnosis:   Encounter Diagnosis   Name Primary?    Pain of left hip joint Yes     Physician: Allie Pugh D*    Visit Date: 9/20/2024     Physician Orders: PT Eval and Treat   Medical Diagnosis from Referral: Pain of left hip joint  Evaluation Date: 8/26/2024  Authorization Period Expiration: none  Plan of Care Expiration: 10/4/24  Progress Note Due: 10/4/24  Visit # / Visits authorized: 6/10   FOTO: 26/100    Date of Last visit: 9/20/24  Total Visits Received: 6    Objective       Range of motion: active range of motion 9/11/24  Motion Right Left    Hip flexion  WITHIN FUNCTIONAL LIMITS   60   Hip extension  WITHIN FUNCTIONAL LIMITS   0   Hip abduction  WITHIN FUNCTIONAL LIMITS   13   Hip adduction  WITHIN FUNCTIONAL LIMITS   15   Internal rotation  WITHIN FUNCTIONAL LIMITS   5   External rotation  WITHIN FUNCTIONAL LIMITS   0   Knee extension  WITHIN FUNCTIONAL LIMITS  WITHIN FUNCTIONAL LIMITS   Knee flexion  WITHIN FUNCTIONAL LIMITS   60   Ankle DF  WITHIN FUNCTIONAL LIMITS   -28   Ankle PF  WITHIN FUNCTIONAL LIMITS   50   Ankle Inversion  WITHIN FUNCTIONAL LIMITS   3   Ankle Eversion  WITHIN FUNCTIONAL LIMITS   0         Manual muscle test   Muscle Right  Left  9/20/24   Hip flexion  MMT strength: 3+/5  MMT strength: 2+/5   Hip extension  MMT strength: 3-/5  MMT strength: 2-/5   Hip abduction  MMT strength: 3+/5  MMT strength: 2+/5   Hip adduction  MMT strength: 5/5  MMT strength: 3-/5   Hip internal rotation  MMT strength: 3+/5  MMT strength: 2/5   Hip external rotation  MMT strength: 3+/5  MMT strength: 2-/5   Knee extension  MMT strength: 5/5  MMT strength: 4+/5   Knee flexion  MMT strength: 5/5  MMT strength: 2+/5   Ankle DF  MMT strength: 5/5  MMT strength: 2/5   Ankle PF  MMT strength: 3+/5  MMT strength: 2/5   Ankle inversion  MMT strength: 3+/5  MMT strength:  2-/5   Ankle eversion  MMT strength: 3+/5  MMT strength: 0/5      ASSESSMENT          Discharge reason: Patient has met all of his/her goals and at maximal rehabilitation.    Discharge FOTO Score: 26    Goals:   Goals: Short Term Goals: 3 weeks   Patient will increase left hip flexion to 10 degrees to allow patient to perform activities of daily living without limitations. Met   Patient will report decrease in pain of left hip to 4/10 with all activities to improve quality of life Met   Patient will ambulate 75 feet with appropriate assistive device SBA to improve gait independence. Met   Patient will be independent with home exercise program. Met         Long Term Goals: 5 weeks   Patient will tolerate 25 minutes or greater of exercise/activity with left hip pain 2/10 or less Met   Patient will ambulate 150 or greater with cane/quad cane SBA to improve home mobility independence. Met    PLAN   This patient is discharged from Physical Therapy      Jeremiah Robles, PT

## 2024-11-05 ENCOUNTER — OFFICE VISIT (OUTPATIENT)
Dept: CARDIOLOGY | Facility: CLINIC | Age: 59
End: 2024-11-05
Payer: MEDICARE

## 2024-11-05 VITALS
DIASTOLIC BLOOD PRESSURE: 68 MMHG | SYSTOLIC BLOOD PRESSURE: 130 MMHG | WEIGHT: 245 LBS | BODY MASS INDEX: 45.08 KG/M2 | OXYGEN SATURATION: 95 % | HEIGHT: 62 IN | HEART RATE: 71 BPM

## 2024-11-05 DIAGNOSIS — I10 ESSENTIAL HYPERTENSION: Primary | ICD-10-CM

## 2024-11-05 PROCEDURE — 99214 OFFICE O/P EST MOD 30 MIN: CPT | Mod: PBBFAC,25 | Performed by: INTERNAL MEDICINE

## 2024-11-05 PROCEDURE — 3078F DIAST BP <80 MM HG: CPT | Mod: CPTII,,, | Performed by: INTERNAL MEDICINE

## 2024-11-05 PROCEDURE — 1159F MED LIST DOCD IN RCRD: CPT | Mod: CPTII,,, | Performed by: INTERNAL MEDICINE

## 2024-11-05 PROCEDURE — 93010 ELECTROCARDIOGRAM REPORT: CPT | Mod: S$PBB,,, | Performed by: INTERNAL MEDICINE

## 2024-11-05 PROCEDURE — 99999 PR PBB SHADOW E&M-EST. PATIENT-LVL IV: CPT | Mod: PBBFAC,,, | Performed by: INTERNAL MEDICINE

## 2024-11-05 PROCEDURE — 4010F ACE/ARB THERAPY RXD/TAKEN: CPT | Mod: CPTII,,, | Performed by: INTERNAL MEDICINE

## 2024-11-05 PROCEDURE — 3075F SYST BP GE 130 - 139MM HG: CPT | Mod: CPTII,,, | Performed by: INTERNAL MEDICINE

## 2024-11-05 PROCEDURE — 99214 OFFICE O/P EST MOD 30 MIN: CPT | Mod: S$PBB,,, | Performed by: INTERNAL MEDICINE

## 2024-11-05 PROCEDURE — 93005 ELECTROCARDIOGRAM TRACING: CPT | Mod: PBBFAC | Performed by: INTERNAL MEDICINE

## 2024-11-05 PROCEDURE — 3008F BODY MASS INDEX DOCD: CPT | Mod: CPTII,,, | Performed by: INTERNAL MEDICINE

## 2024-11-05 NOTE — PROGRESS NOTES
"Cardiology Clinic Note:    PCP: No, Primary Doctor    REFERRING PHYSICIAN:     CHIEF COMPLAINT:     HISTORY OF PRESENT ILLNESS:  Kerri Morrow is a 59 y.o. female with PMH of HTN, HLD, and Stroke. She presented for  follow up and evaluation of palpitations and SOB, notes lower extremity edema and shortness of breath have resolved with change in meds off amlodipine, on increased losartin. . Patient reported palpitations have resolved. She denies  chest pain, SOB, dizziness, lightheadedness, flushing, or nausea.  [place] swelling in her left leg has resolved.       Review of Systems:  Review of Systems   Constitutional: Negative for decreased appetite, fever, malaise/fatigue, weight gain and weight loss.   HENT: Negative.     Eyes:  Negative for blurred vision and visual disturbance.   Cardiovascular:  Positive for dyspnea on exertion, leg swelling and palpitations. Negative for chest pain, irregular heartbeat, near-syncope and orthopnea.   Respiratory:  Positive for shortness of breath and snoring. Negative for wheezing.    Endocrine: Negative.    Hematologic/Lymphatic: Negative.    Musculoskeletal:  Negative for falls.          PAST MEDICAL HISTORY:  Past Medical History:   Diagnosis Date    Acid reflux     High cholesterol     Hypertension     Stroke        PAST SURGICAL HISTORY:  Past Surgical History:   Procedure Laterality Date    MOUTH SURGERY         SOCIAL HISTORY:  Social History     Socioeconomic History    Marital status:    Tobacco Use    Smoking status: Never    Smokeless tobacco: Current     Types: Chew, Snuff    Tobacco comments:     1 bag daily   Substance and Sexual Activity    Alcohol use: Not Currently     Comment: beer "almost every day", states 2-3 quarts of beer a day but states "has slacked up now"    Drug use: Never     Social Drivers of Health     Financial Resource Strain: Low Risk  (8/1/2023)    Overall Financial Resource Strain (CARDIA)     Difficulty of Paying " Living Expenses: Not hard at all   Food Insecurity: No Food Insecurity (8/1/2023)    Hunger Vital Sign     Worried About Running Out of Food in the Last Year: Never true     Ran Out of Food in the Last Year: Never true   Transportation Needs: No Transportation Needs (8/1/2023)    PRAPARE - Transportation     Lack of Transportation (Medical): No     Lack of Transportation (Non-Medical): No   Physical Activity: Inactive (8/1/2023)    Exercise Vital Sign     Days of Exercise per Week: 0 days     Minutes of Exercise per Session: 0 min   Stress: No Stress Concern Present (8/1/2023)    [employer] [place] [employer] - Occupational Stress Questionnaire     Feeling of Stress : Not at all   Housing Stability: Low Risk  (8/1/2023)    Housing Stability Vital Sign     Unable to Pay for Housing in the Last Year: No     Number of Places Lived in the Last Year: 1     Unstable Housing in the Last Year: No       FAMILY HISTORY:  No family history on file.    ALLERGIES:  Allergies as of 11/05/2024 - Reviewed 11/05/2024   Allergen Reaction Noted    Pcn [penicillins] Rash 10/17/2022    Codeine Other (See Comments) 02/07/2024    Iodinated contrast media Itching 09/13/2023         MEDICATIONS:  Current Outpatient Medications on File Prior to Visit   Medication Sig Dispense Refill    aspirin 81 MG Chew Take 81 mg by mouth once daily.      furosemide (LASIX) 40 MG tablet Take 1 tablet (40 mg total) by mouth once daily. 30 tablet 0    losartan (COZAAR) 100 MG tablet Take 1 tablet (100 mg total) by mouth once daily. 90 tablet 3    metoprolol succinate (TOPROL-XL) 25 MG 24 hr tablet Take 1 tablet (25 mg total) by mouth once daily. 30 tablet 6    pantoprazole (PROTONIX) 40 MG tablet Take 1 tablet (40 mg total) by mouth once daily. 30 tablet 2    rosuvastatin (CRESTOR) 20 MG tablet Take 20 mg by mouth once daily.      rosuvastatin (CRESTOR) 10 MG tablet Take 10 mg by mouth once daily. (Patient not taking: Reported  "on 11/5/2024)       No current facility-administered medications on file prior to visit.          PHYSICAL EXAM:  Blood pressure 130/68, pulse 71, height 5' 2" (1.575 m), weight 111.1 kg (245 lb), SpO2 95%.  Wt Readings from Last 3 Encounters:   11/05/24 111.1 kg (245 lb)   12/18/23 105.2 kg (232 lb)   11/07/23 105.5 kg (232 lb 9.6 oz)      Body mass index is 44.81 kg/m².    Physical Exam  Vitals and nursing note reviewed.   Constitutional:       General: She is not in acute distress.     Appearance: Normal appearance. She is obese. She is not ill-appearing, toxic-appearing or diaphoretic.   HENT:      Right Ear: Tympanic membrane and ear canal normal.      Left Ear: Tympanic membrane and ear canal normal.      Nose: Nose normal. No congestion or rhinorrhea.      Mouth/Throat:      Mouth: Mucous membranes are moist.      Pharynx: Oropharynx is clear.   Eyes:      Extraocular Movements: Extraocular movements intact.      Conjunctiva/sclera: Conjunctivae normal.      Pupils: Pupils are equal, round, and reactive to light.   Cardiovascular:      Rate and Rhythm: Normal rate and regular rhythm.      Pulses: Normal pulses.      Heart sounds: Murmur heard.   Pulmonary:      Effort: Pulmonary effort is normal. No respiratory distress.      Breath sounds: Normal breath sounds. No wheezing.   Musculoskeletal:         General: No tenderness. Normal range of motion.      Cervical back: Normal range of motion.      Right lower leg: Edema present.      Left lower leg: Edema present.   Skin:     Capillary Refill: Capillary refill takes less than 2 seconds.   Neurological:      General: No focal deficit present.      Mental Status: She is alert and oriented to person, place, and time. Mental status is at baseline.   Psychiatric:         Mood and Affect: Mood normal.         Behavior: Behavior normal.         Thought Content: Thought content normal.        LABS REVIEWED:  Lab Results   Component Value Date    WBC 5.29 08/03/2023    " RBC 3.56 (L) 08/03/2023    HGB 12.5 08/03/2023    HCT 37.3 (L) 08/03/2023    .8 (H) 08/03/2023    MCH 35.1 (H) 08/03/2023    MCHC 33.5 08/03/2023    RDW 14.1 08/03/2023     08/03/2023    MPV 10.9 08/03/2023    NRBC 0.0 08/03/2023     Lab Results   Component Value Date     09/13/2023    K 3.9 09/13/2023     (H) 09/13/2023    CO2 30 09/13/2023    BUN 14 09/13/2023     Lab Results   Component Value Date    AST 50 (H) 07/31/2023    ALT 53 07/31/2023     Lab Results   Component Value Date     (H) 09/13/2023     Lab Results   Component Value Date    CHOL 174 08/02/2023    HDL 46 08/02/2023    TRIG 189 (H) 08/02/2023    CHOLHDL 3.8 08/02/2023       CARDIAC STUDIES REVIEWED:    OTHER IMAGING STUDIES REVIEWED:  Results for orders placed during the hospital encounter of 07/31/23    Echo    Interpretation Summary    Left Ventricle: The left ventricle is normal in size. Increased wall thickness. There is concentric remodeling. Normal wall motion. There is normal systolic function with a visually estimated ejection fraction of 60 - 65%. Grade I diastolic dysfunction.    Left Atrium: Normal left atrial size.    Right Ventricle: Normal right ventricular cavity size. Systolic function is normal.    Aortic Valve: The aortic valve is a trileaflet valve. There is mild aortic regurgitation with a centrally directed jet.    Mitral Valve: There is no stenosis. The mean pressure gradient across the mitral valve is 3 mmHg at a heart rate of  bpm. There is mild regurgitation.    Tricuspid Valve: There is mild transvalvular regurgitation with a centrally directed jet.    Pulmonic Valve: There is no significant stenosis.       ASSESSMENT:   Essential hypertension  -     EKG 12-lead; Future          PLAN:  1. Bilateral lower extremity edema, has resolved off amlodipine, on  increases  Cozaar to 50 mg q d.   2. Palpitations, rare PVCs, PACs on Zio3., now asymptomatic continue on low dose beta blocker.     3. Hyperlipiemia: controled on Statin  4. Hypertension, not well controlled, will increase Cozaar to 100 mg q d.   5. Diastolic heart failure,much better  compensated on current meds,       Reevaluate in 6 months.

## 2024-11-11 LAB
OHS QRS DURATION: 82 MS
OHS QTC CALCULATION: 457 MS

## 2025-01-26 ENCOUNTER — HOSPITAL ENCOUNTER (EMERGENCY)
Facility: HOSPITAL | Age: 60
Discharge: HOME | End: 2025-01-26
Payer: MEDICARE

## 2025-01-26 VITALS
SYSTOLIC BLOOD PRESSURE: 141 MMHG | HEIGHT: 62 IN | RESPIRATION RATE: 18 BRPM | HEART RATE: 98 BPM | TEMPERATURE: 101 F | OXYGEN SATURATION: 95 % | DIASTOLIC BLOOD PRESSURE: 78 MMHG | BODY MASS INDEX: 45.08 KG/M2 | WEIGHT: 245 LBS

## 2025-01-26 DIAGNOSIS — J10.1 INFLUENZA A: Primary | ICD-10-CM

## 2025-01-26 LAB
INFLUENZA A MOLECULAR (OHS): POSITIVE
INFLUENZA B MOLECULAR (OHS): NEGATIVE
SARS-COV-2 RDRP RESP QL NAA+PROBE: NEGATIVE

## 2025-01-26 PROCEDURE — 25000003 PHARM REV CODE 250

## 2025-01-26 PROCEDURE — 87635 SARS-COV-2 COVID-19 AMP PRB: CPT

## 2025-01-26 PROCEDURE — 99284 EMERGENCY DEPT VISIT MOD MDM: CPT | Mod: ,,,

## 2025-01-26 PROCEDURE — 99283 EMERGENCY DEPT VISIT LOW MDM: CPT

## 2025-01-26 PROCEDURE — 87502 INFLUENZA DNA AMP PROBE: CPT

## 2025-01-26 RX ORDER — ACETAMINOPHEN 500 MG
1000 TABLET ORAL
Status: COMPLETED | OUTPATIENT
Start: 2025-01-26 | End: 2025-01-26

## 2025-01-26 RX ORDER — ONDANSETRON 4 MG/1
4 TABLET, FILM COATED ORAL EVERY 6 HOURS
Qty: 12 TABLET | Refills: 0 | Status: SHIPPED | OUTPATIENT
Start: 2025-01-26

## 2025-01-26 RX ADMIN — ACETAMINOPHEN 1000 MG: 500 TABLET ORAL at 12:01

## 2025-01-26 NOTE — ED PROVIDER NOTES
"Encounter Date: 1/26/2025       History     Chief Complaint   Patient presents with    Generalized Body Aches     Pt complaining of body aches, and cough that started yesterday     59-year-old female presents to the ED complaining of body aches, dry cough, nasal congestion, and nausea x 2 days.  Denies headache, shortness of breath, chest pain, abdominal pain, vomiting or diarrhea, or dysuria.  She is able to speak in clear and complete sentences without respiratory distress with room air oxygenation 95% or better.  PMH includes hypertension, high cholesterol, GERD, and CVA.          The history is provided by the patient.     Review of patient's allergies indicates:   Allergen Reactions    Pcn [penicillins] Rash    Codeine Other (See Comments)    Iodinated contrast media Itching     Past Medical History:   Diagnosis Date    Acid reflux     High cholesterol     Hypertension     Stroke      Past Surgical History:   Procedure Laterality Date    MOUTH SURGERY       No family history on file.  Social History     Tobacco Use    Smoking status: Never    Smokeless tobacco: Current     Types: Chew, Snuff    Tobacco comments:     1 bag daily   Substance Use Topics    Alcohol use: Not Currently     Comment: beer "almost every day", states 2-3 quarts of beer a day but states "has slacked up now"    Drug use: Never     Review of Systems   Constitutional:  Negative for chills and fever.   HENT:  Positive for congestion. Negative for rhinorrhea and sore throat.    Eyes:  Negative for visual disturbance.   Respiratory:  Positive for cough. Negative for shortness of breath.    Cardiovascular:  Negative for chest pain.   Gastrointestinal:  Positive for nausea. Negative for abdominal pain, constipation, diarrhea and vomiting.   Genitourinary:  Negative for dysuria, flank pain, frequency and hematuria.   Musculoskeletal:  Positive for myalgias. Negative for neck pain and neck stiffness.   Skin:  Negative for rash and wound. "   Neurological:  Negative for dizziness, weakness and headaches.   Psychiatric/Behavioral:  Negative for suicidal ideas.        Physical Exam     Initial Vitals [01/26/25 1230]   BP Pulse Resp Temp SpO2   (!) 163/80 96 18 (!) 102.3 °F (39.1 °C) 99 %      MAP       --         Physical Exam    Nursing note and vitals reviewed.  Constitutional: She appears well-developed and well-nourished. No distress.   HENT:   Head: Normocephalic.   Right Ear: External ear normal.   Left Ear: External ear normal. Mouth/Throat: Oropharynx is clear and moist. No oropharyngeal exudate.   Eyes: Conjunctivae and EOM are normal. Pupils are equal, round, and reactive to light. No scleral icterus.   Neck:   Normal range of motion.  Cardiovascular:  Normal rate, regular rhythm, normal heart sounds and intact distal pulses.           Pulmonary/Chest: Breath sounds normal. No respiratory distress. She has no wheezes. She has no rhonchi.   Abdominal: Abdomen is soft. Bowel sounds are normal. She exhibits no distension. There is no abdominal tenderness.   Musculoskeletal:         General: Normal range of motion.      Cervical back: Normal range of motion.     Lymphadenopathy:     She has no cervical adenopathy.   Neurological: She is alert and oriented to person, place, and time. She has normal strength. GCS score is 15. GCS eye subscore is 4. GCS verbal subscore is 5. GCS motor subscore is 6.   Skin: Skin is warm and dry. Capillary refill takes less than 2 seconds.   Psychiatric: She has a normal mood and affect.         Medical Screening Exam   See Full Note    ED Course   Procedures  Labs Reviewed   INFLUENZA A & B BY MOLECULAR - Abnormal       Result Value    INFLUENZA A MOLECULAR Positive (*)     INFLUENZA B MOLECULAR  Negative     SARS-COV-2 RNA AMPLIFICATION, QUAL - Normal    SARS COV-2 Molecular Negative      Narrative:     Negative SARS-CoV results should not be used as the sole basis for treatment or patient management decisions;  negative results should be considered in the context of a patient's recent exposures, history and the presene of clinical signs and symptoms consistent with COVID-19.  Negative results should be treated as presumptive and confirmed by molecular assay, if necessary for patient management.          Imaging Results    None          Medications   acetaminophen tablet 1,000 mg (1,000 mg Oral Given 1/26/25 1278)     Medical Decision Making  The presentation of Kerri Morrow is consistent with upper respiratory infection, viral in nature influenza a swab positive. There were no clinical or ancillary findings suggestive of bronchitis, pneumonia, acute sinusitis, chronic sinusitis, or streptococcal pharyngitis, thus there was no indications for antibiotics.    Patient treated in the ED with Tylenol and prescription for Zofran sent in to pharmacy of choice.    Upon discharge, Kerri Morrow has no evidence of respiratory failure and is comfortable without respiratory distress. Additionally, Kerri Morrow has no evidence of airway compromise and is speaking in full/complete sentences without difficulty. Kerri Morrow meets outpatient treatment criteria.    Data Reviewed/Counseling: I have reviewed the patient's vital signs, nursing notes, and other relevant ancillary testing/information. I have had a detailed discussion with the patient regarding the historical points, examination findings, and any diagnostic results. I have also discussed the need for outpatient follow-up. I have recommended symptomatic therapy with over the counter remedies. Symptomatic therapy suggested: Increase fluids, use vaporizer, stay in steamy bathroom tid 15 min prn severe cough, Tylenol/Motrin as needed, rest, avoid smoky areas. Follow up with PCP in the next 5-7 days for re-evaluation.    Kerri Morrow has been counseled to return to the Emergency Department if symptoms worsen or if there are any questions or concerns that arise  while at home.    Kerri Morrow was encouraged to practice good infection control procedures to include but not limited to frequent hand washing to lessen likelihood of transmission of this infection.     Amount and/or Complexity of Data Reviewed  Independent Historian:      Details: 59-year-old female presents to the ED complaining of body aches, dry cough, and nausea x 2 days.  Denies headache, shortness of breath, chest pain, abdominal pain, vomiting or diarrhea, or dysuria.  She is able to speak in clear and complete sentences without respiratory distress with room air oxygenation 95% or better.  PMH includes hypertension, high cholesterol, GERD, and CVA.  Labs:      Details: COVID swab negative  Influenza a swab positive    Risk  OTC drugs.  Prescription drug management.                                      Clinical Impression:   Final diagnoses:  [J10.1] Influenza A (Primary)        ED Disposition Condition    Discharge Stable          ED Prescriptions       Medication Sig Dispense Start Date End Date Auth. Provider    ondansetron (ZOFRAN) 4 MG tablet Take 1 tablet (4 mg total) by mouth every 6 (six) hours. 12 tablet 1/26/2025 -- Lorena Blanco NP          Follow-up Information    None          Lorena Blanco NP  01/26/25 1412       Lorena Blanco NP  01/26/25 1412

## 2025-01-26 NOTE — DISCHARGE INSTRUCTIONS
Follow up with primary care provider in the next 5-7 days.   Quarantine in home for 7 days from start of your symptoms.  Motrin 600 mg every 8 hours as needed for fever/pain/discomfort.   Tylenol 650 mg every 4 hours as needed for fever/pain/discomfort.    Zofran as directed for nausea/vomiting.   Saline nasal drops/spray and/or mist humidifier to help with nasal congestion/stuffiness.  Increase fluid intake. This should include water and drinks with electrolytes such as Gatorade, Powerade, Pedialyte, etc..  Return to emergency department for any future emergencies.

## 2025-01-29 ENCOUNTER — TELEPHONE (OUTPATIENT)
Dept: EMERGENCY MEDICINE | Facility: HOSPITAL | Age: 60
End: 2025-01-29
Payer: MEDICARE